# Patient Record
Sex: FEMALE | Race: WHITE | NOT HISPANIC OR LATINO | Employment: UNEMPLOYED | URBAN - METROPOLITAN AREA
[De-identification: names, ages, dates, MRNs, and addresses within clinical notes are randomized per-mention and may not be internally consistent; named-entity substitution may affect disease eponyms.]

---

## 2023-10-30 PROBLEM — R15.1 FECAL SOILING: Status: ACTIVE | Noted: 2023-10-30

## 2023-10-30 PROBLEM — K64.8 PROLAPSED INTERNAL HEMORRHOIDS: Status: ACTIVE | Noted: 2023-10-30

## 2023-10-30 PROBLEM — F41.9 ANXIETY: Status: ACTIVE | Noted: 2023-10-30

## 2023-10-30 PROBLEM — N63.20 LEFT BREAST MASS: Status: ACTIVE | Noted: 2023-10-30

## 2023-10-30 PROBLEM — R92.8 ABNORMAL MAMMOGRAM OF LEFT BREAST: Status: ACTIVE | Noted: 2023-10-30

## 2023-10-30 PROBLEM — L29.0 PRURITUS ANI: Status: ACTIVE | Noted: 2023-10-30

## 2023-10-30 RX ORDER — BROMPHENIRAMINE MALEATE, PSEUDOEPHEDRINE HYDROCHLORIDE, AND DEXTROMETHORPHAN HYDROBROMIDE 2; 30; 10 MG/5ML; MG/5ML; MG/5ML
SYRUP ORAL
COMMUNITY
Start: 2022-02-18

## 2023-10-30 RX ORDER — FLUTICASONE PROPIONATE 50 MCG
2 SPRAY, SUSPENSION (ML) NASAL DAILY
COMMUNITY
Start: 2022-02-18

## 2023-10-30 RX ORDER — DOXYCYCLINE HYCLATE 100 MG
100 TABLET ORAL EVERY 12 HOURS
COMMUNITY
Start: 2022-02-18 | End: 2023-11-17 | Stop reason: ALTCHOICE

## 2023-11-01 ENCOUNTER — APPOINTMENT (OUTPATIENT)
Dept: ORTHOPEDIC SURGERY | Facility: CLINIC | Age: 53
End: 2023-11-01

## 2023-11-09 ENCOUNTER — ANCILLARY PROCEDURE (OUTPATIENT)
Dept: RADIOLOGY | Facility: CLINIC | Age: 53
End: 2023-11-09
Payer: COMMERCIAL

## 2023-11-09 ENCOUNTER — OFFICE VISIT (OUTPATIENT)
Dept: ORTHOPEDIC SURGERY | Facility: CLINIC | Age: 53
End: 2023-11-09
Payer: COMMERCIAL

## 2023-11-09 DIAGNOSIS — G89.29 CHRONIC LOW BACK PAIN, UNSPECIFIED BACK PAIN LATERALITY, UNSPECIFIED WHETHER SCIATICA PRESENT: Primary | ICD-10-CM

## 2023-11-09 DIAGNOSIS — M54.16 LUMBAR RADICULOPATHY: ICD-10-CM

## 2023-11-09 DIAGNOSIS — G89.29 CHRONIC LOW BACK PAIN, UNSPECIFIED BACK PAIN LATERALITY, UNSPECIFIED WHETHER SCIATICA PRESENT: ICD-10-CM

## 2023-11-09 DIAGNOSIS — M54.50 CHRONIC LOW BACK PAIN, UNSPECIFIED BACK PAIN LATERALITY, UNSPECIFIED WHETHER SCIATICA PRESENT: Primary | ICD-10-CM

## 2023-11-09 DIAGNOSIS — M54.50 CHRONIC LOW BACK PAIN, UNSPECIFIED BACK PAIN LATERALITY, UNSPECIFIED WHETHER SCIATICA PRESENT: ICD-10-CM

## 2023-11-09 PROCEDURE — 72110 X-RAY EXAM L-2 SPINE 4/>VWS: CPT

## 2023-11-09 PROCEDURE — 99204 OFFICE O/P NEW MOD 45 MIN: CPT | Performed by: ORTHOPAEDIC SURGERY

## 2023-11-09 PROCEDURE — 99214 OFFICE O/P EST MOD 30 MIN: CPT | Performed by: ORTHOPAEDIC SURGERY

## 2023-11-09 PROCEDURE — 72110 X-RAY EXAM L-2 SPINE 4/>VWS: CPT | Performed by: RADIOLOGY

## 2023-11-09 NOTE — PROGRESS NOTES
Li Allison is a 53 y.o. female who presents for Pain of the Lower Back (LBP, Rt Leg Pain - X-Rays Today - Pain began 1 yr ago, Mobic and Steroids for Tx ).    HPI:  53-year-old female is here for evaluation of low back pain and right hip and right leg pain.  She denies any fever chills nausea vomiting night sweats she has no bowel or bladder complaints.    Physical exam:  Well-nourished, well kept. No lymphangitis or lymphadenopathy in the examined extremities.  Gait normal.  Can walk on heels and toes.   Examination of the back shows tenderness in the paraspinous musculature.  There is decreased range of motion in all directions due to guarding/muscle spasms and pain at extremes.  There is good strength and no instability.  Examination of the lower extremities reveals no point tenderness, swelling, or deformity.  Range of motion of the hips, knees, and ankles are full without crepitance, instability, or exacerbation of pain.  Strength is 5/5 throughout except she is exquisitely tender over the right greater trochanteric bursa.  Also except internal/external ranging of the right hip reproduces a lot of her hip and groin pain on the right side..  Gross sensation intact in the extremities.  Deep tendon reflexes 2+ and symmetric bilaterally.  clonus, negative.  Affect normal.  Alert and oriented ×3.  Coordination normal.    Imaging studies:  AP lateral flexion-extension plain films of the lumbar spine were ordered and reviewed today.    Assessment:  53-year-old female with a little more than a year history of midline low back pain and right leg radicular symptoms.  Overall the leg bothers her worse 80% versus 20% back.  Nothing on the left.  It starts in her low back radiates out into the hip a little bit into the groin down the lateral thigh and quadricep wraps around the knee goes into the calf and shin down into the dorsal aspect of the right foot.  She saw a couple of back doctors previously, the notes from   [facility] from January 10, 2023 were reviewed.  Has not done much for this other than some steroids and Mobic earlier this year.  No physical therapy no epidural injections no chiropractic no surgery on her back.  Think this may be 2 separate problems, low back with a spondylolisthesis and right hip.    Plan:    For complete plan and/or surgical details, please refer to Dr. Boo's portion of this split dictation.    In a face-to-face encounter, I performed a history and physical examination, discussed pertinent diagnostic studies if indicated, and discussed diagnosis and management strategies with both the patient and the midlevel provider.  I reviewed the midlevel's note and agree with the documented findings and plan of care.    Back pain right leg radiculopathy.  Patient has moderate right hip degenerative changes.  But she also has a grade 2 spondylolisthesis at L4-5.  This is likely more of a back problem than a hip problem.  She is overweight.  We had a long discussion today about her issues.  I think the best way to treat her is to get an MRI of her lumbar spine which she has never had done despite the fact that she seen multiple people for this including 2 different spine surgeons.  Were also can get her into physical therapy which she has never had done as well.  We are going to give her the name of a book today and a pamphlet to work on an anti-inflammatory diet which she said she is trying to do.  And we will see her back after the MRI.  It looks to me that if this MRI shows something on her back we would address the upstream/back problems first before we address her right hip degenerative changes.

## 2023-11-17 ENCOUNTER — OFFICE VISIT (OUTPATIENT)
Dept: URGENT CARE | Facility: CLINIC | Age: 53
End: 2023-11-17
Payer: COMMERCIAL

## 2023-11-17 VITALS
HEART RATE: 87 BPM | WEIGHT: 260 LBS | SYSTOLIC BLOOD PRESSURE: 159 MMHG | RESPIRATION RATE: 18 BRPM | OXYGEN SATURATION: 97 % | DIASTOLIC BLOOD PRESSURE: 84 MMHG | TEMPERATURE: 97.7 F

## 2023-11-17 DIAGNOSIS — J06.9 ACUTE URI: Primary | ICD-10-CM

## 2023-11-17 DIAGNOSIS — J01.91 ACUTE RECURRENT SINUSITIS, UNSPECIFIED LOCATION: ICD-10-CM

## 2023-11-17 PROCEDURE — 1036F TOBACCO NON-USER: CPT | Performed by: FAMILY MEDICINE

## 2023-11-17 PROCEDURE — 99203 OFFICE O/P NEW LOW 30 MIN: CPT | Performed by: FAMILY MEDICINE

## 2023-11-17 RX ORDER — DOXYCYCLINE 100 MG/1
100 CAPSULE ORAL 2 TIMES DAILY
Qty: 14 CAPSULE | Refills: 0 | Status: SHIPPED | OUTPATIENT
Start: 2023-11-17 | End: 2023-11-24

## 2023-11-17 ASSESSMENT — ENCOUNTER SYMPTOMS
FREQUENCY: 0
SINUS COMPLAINT: 1
CONSTIPATION: 0
NAUSEA: 0
WHEEZING: 1
CHEST TIGHTNESS: 0
SORE THROAT: 1
HEADACHES: 1
CHILLS: 1
ABDOMINAL PAIN: 0
PALPITATIONS: 0
SHORTNESS OF BREATH: 0
TROUBLE SWALLOWING: 1
RHINORRHEA: 0
COUGH: 1
DYSURIA: 0
SINUS PRESSURE: 1
DIARRHEA: 0
FEVER: 0
VOMITING: 0

## 2023-11-17 NOTE — PROGRESS NOTES
"Subjective   Patient ID: Li Allison is a 53 y.o. female.      53-year-old morbidly obese  female presents  stating \"I have a sinus infection\".  She reports onset of respiratory symptoms yesterday and reports that her 16-year-old son is recovering from of upper respiratory illness.  Reports she is taking Advil cold and sinus and Advil for symptom relief.      History provided by:  Patient  Sinus Problem  Severity:  Severe  Onset quality:  Unable to specify  Duration:  1 day  Timing:  Constant  Progression:  Worsening  Chronicity:  New  Associated symptoms: congestion, cough, ear pain, headaches, sore throat and wheezing    Associated symptoms: no abdominal pain, no diarrhea, no fever, no nausea, no rhinorrhea, no shortness of breath and no vomiting        The following portions of the chart were reviewed this encounter and updated as appropriate:  Tobacco  Allergies  Meds  Problems  Med Hx  Surg Hx  Fam Hx         Review of Systems   Constitutional:  Positive for chills. Negative for fever.   HENT:  Positive for congestion, ear pain, sinus pressure, sore throat and trouble swallowing. Negative for rhinorrhea.    Respiratory:  Positive for cough and wheezing. Negative for chest tightness and shortness of breath.    Cardiovascular:  Negative for palpitations.   Gastrointestinal:  Negative for abdominal pain, constipation, diarrhea, nausea and vomiting.   Genitourinary:  Negative for dysuria and frequency.   Neurological:  Positive for headaches.     Objective   Physical Exam  Vital signs are reviewed. Blood pressure elevated at 159/84. Alert and oriented x3 with normal mood and affect  Patient is well nourished, well-developed, alert and in no acute distress    Tender to palpation over frontal and maxillary sinus areas    External eyes, orbits, conjunctiva and eyelids are normal in appearance  Pupils are equal, round, reactive to light and accommodation, extraocular movements intact    External ears " appear normal  External canals are normal in appearance  Right tympanic membrane is intact and  pale pink in appearance  Left tympanic membrane is intact and  pale pink in appearance  There is no middle ear effusion noted on the right  There is no middle ear effusion noted on the left  External appearance of the nose is normal  Nasal mucosa, septum, turbinates are  moderately reddened and moderately swollen in appearance  There is  thin yellow nasal discharge in both nares    Oral mucosa is uniformly pink and moist  Palate is pink, symmetric and intact  Tongue is moist, mobile and midline  Tonsils are present, not enlarged,  moderately erythematous with no concretions or exudates present  No cervical lymphadenopathy palpated    Heart has regular rate and rhythm. No murmurs, rubs or gallops are auscultated at this exam.    Respiratory rate rhythm and effort are normal. Breath sounds bilaterally are clear on auscultation without crackles, rhonchi, wheezes or friction rub.    Abdomen: Normal bowel sounds on auscultation. Soft, nontender without rebound or rigidity on palpation          Procedures    Assessment/Plan   Diagnoses and all orders for this visit:  Acute URI  Acute recurrent sinusitis, unspecified location  -     doxycycline (Vibramycin) 100 mg capsule; Take 1 capsule (100 mg) by mouth 2 times a day for 7 days. Take with at least 8 ounces (large glass) of water, do not lie down for 30 minutes after

## 2023-11-17 NOTE — PATIENT INSTRUCTIONS
You have acute sinusitis. This can be a bacterial or viral infection. Based on your history and the clinical exam I am treating this as a bacterial infection.  Please increase your oral fluids for the next 7-10 days  Please take doxycycline as prescribed. Do not take this medication at the same meal that you are consuming dairy products or nutritional supplements as it will reduce absorption of the antibiotic. However try to take this medicine with at least an 8 ounce glass of water and some food to reduce the chances of stomach upset. I recommend using probiotics while taking the antibiotic and for a week to 10 days after you have completed this medication. Please ask the pharmacist for advice on appropriate product for this purpose.  May use Mucinex as per label instructions for nasal congestion  You may use nasal saline drops for relief of congestion as needed  You may mix 1 teaspoon of table salt with 8 ounces of warm water to rinse and gargle your sore throat.  You may do this repeatedly for up to 15 minutes if it seems to relieve your discomfort.  Do not swallow this liquid  May take Tylenol (acetaminophen) 325 mg, 2 tablets by mouth every 4-6 hours as needed for fever or discomfort. May take Motrin or Advil (ibuprofen) 200 mg, 2 tablets by mouth every 8 hours as needed for fever   If no improvement in 5-7 days please follow-up with your primary care provider  If fever greater than 102 degrees Fahrenheit, chills, nausea, vomiting, increased redness, tenderness, pain over for head or cheek bone areas, bloody nasal discharge, increased difficulty breathing, difficulty swallowing, increased wheezing, shortness of breath please go immediately to emergency room for further evaluation  This note was generated by voice recognition software. Minor transcription/grammatical errors may be present. Please call for clarification.

## 2023-11-18 RX ORDER — AMOXICILLIN 875 MG/1
875 TABLET, FILM COATED ORAL 2 TIMES DAILY
Qty: 20 TABLET | Refills: 0 | Status: SHIPPED | OUTPATIENT
Start: 2023-11-18 | End: 2023-11-28

## 2023-11-28 DIAGNOSIS — G89.29 CHRONIC LOW BACK PAIN, UNSPECIFIED BACK PAIN LATERALITY, UNSPECIFIED WHETHER SCIATICA PRESENT: ICD-10-CM

## 2023-11-28 DIAGNOSIS — M54.50 CHRONIC LOW BACK PAIN, UNSPECIFIED BACK PAIN LATERALITY, UNSPECIFIED WHETHER SCIATICA PRESENT: ICD-10-CM

## 2023-11-28 DIAGNOSIS — M54.16 LUMBAR RADICULOPATHY: ICD-10-CM

## 2023-11-28 RX ORDER — CYCLOBENZAPRINE HCL 10 MG
10 TABLET ORAL 3 TIMES DAILY PRN
Qty: 30 TABLET | Refills: 0 | Status: SHIPPED | OUTPATIENT
Start: 2023-11-28 | End: 2024-04-09

## 2023-11-30 ENCOUNTER — TELEPHONE (OUTPATIENT)
Dept: ORTHOPEDIC SURGERY | Facility: CLINIC | Age: 53
End: 2023-11-30
Payer: COMMERCIAL

## 2023-12-01 DIAGNOSIS — M54.16 LUMBAR RADICULOPATHY: ICD-10-CM

## 2023-12-05 NOTE — PROGRESS NOTES
History of Present Complaint:  The patient was referred to us by Referring Provider: Sha Boo (the patient called [facility] office stating that Flexeril is not helping with his discomfort so was sent to pain management!). this is 53 y.o.  female Not accompanied by another personwith a past history of morbid obesity BMI 54.4, depression and bipolar with untreated hypertension she lost her PCP because she refused blood test for her fear of needles (Trypanophobia) presenting with right leg pain mostly in the groin area that shoots down to her knee and sometimes numbness tingling down the anterior shin to her big toe.  Patient has no paralysis no saddle paresthesia no incontinence.  The patient stated that she had this pain on and off for many years.  When I asked her about her high blood pressure today she stated she does not have a primary care because the primary care kicked her out since she would not allow him to drop blood for her work because of her phobia.  The patient does not have any MRI.  She said that she had x-ray of her right hip somewhere in the system but it is not available to review.  X-ray of the lumbar spine showed severe arthritis of the right hip which correlate with her exam.  The patient does not want any injections.  I told her that I will start her on a prednisone taper dose and I will get her connected with our comprehensive program.  In the meantime I encouraged her to find a primary care physician to treat her blood pressure since meloxicam may increase her blood pressure.  The patient could have her injection of her right hip done after lorazepam which patient calls if she decided to proceed with that.      Procedures:   None the patient has major phobia of needles she does not allow her PCP to draw her blood    Portions of record reviewed for pertinent issues: active problem list, medication list, allergies, family history, social history, notes from last encounter, encounters,  lab results, imaging and other available records.    I have personally reviewed the [facility] report for this patient. This report is scanned into the electronic medical record. I have considered the risks of abuse, dependence, addiction and diversion. It showed: No chronic controlled substance  OPIOID RISK ASSESSMENT SCORE 3/26  Aberrant behavior: none      Diagnostic studies:  11/9/2023 flexion-extension x-ray of the lumbar spine showed movement of grade 1-2 spondylolisthesis at L4-5 worsening on flexion and severe loss right hip space      Employment/disability/litigation: The patient works for [employer] for 18 years then injured her ankle most likely [employer] and ended up on disability    Social History:  with 2 children finished high school denies smoking drinking or use of illicit drugs      Review of Systems   HENT: Negative.     Eyes: Negative.    Respiratory: Negative.     Cardiovascular: Negative.    Gastrointestinal: Negative.    Endocrine: Negative.    Genitourinary: Negative.    Musculoskeletal:  Positive for arthralgias, back pain and gait problem.   Skin: Negative.    Hematological: Negative.    Psychiatric/Behavioral: Negative.        Physical Exam  Nursing note reviewed.   Constitutional:       Appearance: Normal appearance.   HENT:      Head: Normocephalic and atraumatic.      Nose: Nose normal.   Eyes:      Extraocular Movements: Extraocular movements intact.      Conjunctiva/sclera: Conjunctivae normal.      Pupils: Pupils are equal, round, and reactive to light.   Cardiovascular:      Rate and Rhythm: Normal rate and regular rhythm.      Pulses: Normal pulses.      Heart sounds: Normal heart sounds.   Pulmonary:      Effort: Pulmonary effort is normal.      Breath sounds: Normal breath sounds.   Abdominal:      General: Abdomen is flat. Bowel sounds are normal.      Palpations: Abdomen is soft.   Musculoskeletal:         General: Tenderness present.      Comments: Significant pain with  lateral rotation of her right hip   Skin:     General: Skin is warm.   Neurological:      General: No focal deficit present.      Mental Status: She is alert and oriented to person, place, and time.      Cranial Nerves: Cranial nerves 2-12 are intact.      Sensory: Sensation is intact.      Motor: Motor function is intact.      Coordination: Coordination is intact.      Gait: Gait is intact.      Deep Tendon Reflexes: Reflexes are normal and symmetric.      Comments: The patient walks favoring her right leg   Psychiatric:         Mood and Affect: Mood normal.         Behavior: Behavior normal.            Assessment  Morbidly obese pleasant lady that she has major phobia with needles even her primary care has to dismiss her because she is having hard time drawing drawing her blood for blood test.  The patient is here because of her pain in the groin that goes anterior thigh which really clearly correlate with her right hip arthritis and also she has radiculopathy down in the L4-5 level.  The patient has a stated refused any injections.  She is going to start physical therapy soon.  She has no incontinence no saddle paresthesia no paralysis at this time.  I am going to start her on a prednisone taper dose in addition to meloxicam 15 mg after she is done with her prednisone for her arthritis pain.  The patient stated that she had x-[facility] of her hip at the [facility] somewhere but I could not find it but her x-ray of the lumbar spine showed significant disease in the right hip with decreased joint space.         Plan  At least 50% of the visit was involved in the discussion of the options for treatment. We discussed exercises, medication, interventional therapies and surgery. Healthy life style is essential with patient hard work to achieve the wellness. In addition; discussion with the patient and/or family about any of the diagnostic results, impressions and/or recommended diagnostic studies, prognosis, risks and  benefits of treatment options, instructions for treatment and/or follow-up, importance of compliance with chosen treatment options, risk-factor reduction, and patient/family education.         Pool therapy, walking in the pool, at least 3x per week for 30 minutes  Referral to [facility]  Patient is going to physical therapy shortly  Prednisone 10 mg taper dose  Meloxicam 15 mg to take after her please take as directed after lunch:  6 TABS X 2 DAYS, 5 TABS X 2 DAYS, 4 TABS X 2 DAYS, 3 TABS X  2 DAYS  2 TABS X 7 DAYS, 1 TAB  X  2 WEEKS  For arthritis pain  Right hip joint injection when patient calls after lorazepam 2 mg  Consider right L4-5 TFESI when the patient calls  Healthy lifestyle and anti-inflammatory diet in addition to weight control discussed with the patient  Alternative chronic pain therapies was discussed, encouraged and information was handed  Return to Clinic 3 months       *Please note this report has been produced using speech recognition software and may contain errors related to that system including grammar, punctuation and spelling as well as words and phrases that may be inappropriate. If there are questions or concerns, please feel free to contact me to clarify.    Nubia Cabral MD

## 2023-12-06 ENCOUNTER — OFFICE VISIT (OUTPATIENT)
Dept: PAIN MEDICINE | Facility: CLINIC | Age: 53
End: 2023-12-06
Payer: COMMERCIAL

## 2023-12-06 VITALS
DIASTOLIC BLOOD PRESSURE: 80 MMHG | TEMPERATURE: 97.2 F | SYSTOLIC BLOOD PRESSURE: 152 MMHG | HEIGHT: 61 IN | RESPIRATION RATE: 16 BRPM | WEIGHT: 288 LBS | HEART RATE: 83 BPM | BODY MASS INDEX: 54.37 KG/M2

## 2023-12-06 DIAGNOSIS — M47.27 LUMBOSACRAL SPONDYLOSIS WITH RADICULOPATHY: Primary | ICD-10-CM

## 2023-12-06 DIAGNOSIS — F40.298 FEAR OF NEEDLES: ICD-10-CM

## 2023-12-06 DIAGNOSIS — M43.17 ACQUIRED SPONDYLOLISTHESIS OF LUMBOSACRAL REGION: ICD-10-CM

## 2023-12-06 DIAGNOSIS — M54.16 LUMBAR RADICULOPATHY: ICD-10-CM

## 2023-12-06 PROCEDURE — 99214 OFFICE O/P EST MOD 30 MIN: CPT | Performed by: ANESTHESIOLOGY

## 2023-12-06 PROCEDURE — 1036F TOBACCO NON-USER: CPT | Performed by: ANESTHESIOLOGY

## 2023-12-06 PROCEDURE — 99204 OFFICE O/P NEW MOD 45 MIN: CPT | Performed by: ANESTHESIOLOGY

## 2023-12-06 RX ORDER — MELOXICAM 15 MG/1
15 TABLET ORAL DAILY
Qty: 30 TABLET | Refills: 2 | Status: SHIPPED | OUTPATIENT
Start: 2023-12-06 | End: 2024-02-16 | Stop reason: SDUPTHER

## 2023-12-06 RX ORDER — PREDNISONE 10 MG/1
TABLET ORAL
Qty: 64 TABLET | Refills: 0 | Status: SHIPPED | OUTPATIENT
Start: 2023-12-06 | End: 2024-02-16 | Stop reason: SDUPTHER

## 2023-12-06 ASSESSMENT — COLUMBIA-SUICIDE SEVERITY RATING SCALE - C-SSRS
2. HAVE YOU ACTUALLY HAD ANY THOUGHTS OF KILLING YOURSELF?: NO
6. HAVE YOU EVER DONE ANYTHING, STARTED TO DO ANYTHING, OR PREPARED TO DO ANYTHING TO END YOUR LIFE?: NO
1. IN THE PAST MONTH, HAVE YOU WISHED YOU WERE DEAD OR WISHED YOU COULD GO TO SLEEP AND NOT WAKE UP?: NO

## 2023-12-06 ASSESSMENT — ENCOUNTER SYMPTOMS
DEPRESSION: 0
HEMATOLOGIC/LYMPHATIC NEGATIVE: 1
OCCASIONAL FEELINGS OF UNSTEADINESS: 0
RESPIRATORY NEGATIVE: 1
BACK PAIN: 1
LOSS OF SENSATION IN FEET: 0
PSYCHIATRIC NEGATIVE: 1
CARDIOVASCULAR NEGATIVE: 1
EYES NEGATIVE: 1
GASTROINTESTINAL NEGATIVE: 1
ENDOCRINE NEGATIVE: 1
ARTHRALGIAS: 1

## 2023-12-06 ASSESSMENT — PATIENT HEALTH QUESTIONNAIRE - PHQ9
SUM OF ALL RESPONSES TO PHQ9 QUESTIONS 1 AND 2: 0
2. FEELING DOWN, DEPRESSED OR HOPELESS: NOT AT ALL
1. LITTLE INTEREST OR PLEASURE IN DOING THINGS: NOT AT ALL

## 2023-12-06 ASSESSMENT — LIFESTYLE VARIABLES: TOTAL SCORE: 3

## 2023-12-06 ASSESSMENT — PAIN SCALES - GENERAL: PAINLEVEL: 8

## 2023-12-06 NOTE — PROGRESS NOTES
Chief Complaint   Patient presents with    Back Pain    New Patient Visit     Right leg pain     History of Present Complaint:  The patient was referred to us by Referring Provider: Sha Boo . this is 53 y.o.  female  Not accompanied by another person with a past history of  chronic back pain  presenting with Lower back pain and right-sided leg pain.  Patient is very afraid of needles and states that if she has to have any kind of treatment with needles she would not be interested.    Pain started One years ago .pain is better throughout the summer winter months the pain comes back pain is The same worse   The pain is described as   describes it as having sharp pains  and is relieved by none      Prior Pain Therapies:  pain mobic and steroids   Past surgical history:   Left ear surgery, tubal ligation    Employment/disability/litigation:  Patient worked for [employer] for 18 years fractured right ankle and was unable to go back to work    Social history: , Has 2children, 0grandchildren and 0great-grandchildren, Finished high school, and Denies smoking drinking or use of illicit drugs    Diagnostic studies: xrays    Opioid Risk Assessment Score 3/26 bipolar depression

## 2023-12-07 DIAGNOSIS — M54.16 LUMBAR RADICULOPATHY: ICD-10-CM

## 2023-12-07 DIAGNOSIS — M43.17 ACQUIRED SPONDYLOLISTHESIS OF LUMBOSACRAL REGION: ICD-10-CM

## 2023-12-07 DIAGNOSIS — M47.27 LUMBOSACRAL SPONDYLOSIS WITH RADICULOPATHY: ICD-10-CM

## 2023-12-08 ENCOUNTER — EVALUATION (OUTPATIENT)
Dept: PHYSICAL THERAPY | Facility: CLINIC | Age: 53
End: 2023-12-08
Payer: COMMERCIAL

## 2023-12-08 DIAGNOSIS — M54.16 LUMBAR RADICULOPATHY: ICD-10-CM

## 2023-12-08 PROCEDURE — 97161 PT EVAL LOW COMPLEX 20 MIN: CPT | Mod: GP | Performed by: PHYSICAL THERAPIST

## 2023-12-08 RX ORDER — PREDNISONE 10 MG/1
TABLET ORAL
Qty: 64 TABLET | Refills: 0 | Status: SHIPPED | OUTPATIENT
Start: 2023-12-08 | End: 2024-05-01 | Stop reason: SDUPTHER

## 2023-12-08 RX ORDER — PREDNISONE 10 MG/1
TABLET ORAL
Qty: 64 TABLET | Refills: 0 | OUTPATIENT
Start: 2023-12-08

## 2023-12-08 ASSESSMENT — PATIENT HEALTH QUESTIONNAIRE - PHQ9
1. LITTLE INTEREST OR PLEASURE IN DOING THINGS: NOT AT ALL
2. FEELING DOWN, DEPRESSED OR HOPELESS: NOT AT ALL
SUM OF ALL RESPONSES TO PHQ9 QUESTIONS 1 AND 2: 0

## 2023-12-08 ASSESSMENT — ENCOUNTER SYMPTOMS
DEPRESSION: 0
LOSS OF SENSATION IN FEET: 0
OCCASIONAL FEELINGS OF UNSTEADINESS: 0

## 2023-12-08 NOTE — PROGRESS NOTES
Patient Name Li Allison  MRN: [id number]  Today's Date: 23  Time Calculation  Start Time: 830  Stop Time: 915  Time Calculation (min): 45 min    Insurance:   Visit #: 1  [employer]/med necessity/100%/ded met  Insurance Reviewed  (per information provided by  pre-cert team)   Authorization required:  Y  Approved # of visits:  Authorization date range:      Therapy Diagnoses:   1. Lumbar radiculopathy  Referral to Physical Therapy    Follow Up In Physical Therapy        General:  Reason for visit: LS radiculopathy   Referred by: Nubia Wylie MD appt:  none currently scheduled  Preferred Name:  Li  Script:  PT  Onset Date:  22  Most recent assessment/re-assessment: 23  Email/phone #:      Assessment:    Evolving with changing characteristics  Level of complexity:  low  Patient with flare up of OA in the LS/possible R piriformis syndrome and R hip OA, needs work on/Skilled PT for ROM, flexibility, dynamic stabilization, strength, posture, body mechanics and gait/stairs for improved overall function.       Problems:    Pain:  __x_  Posture/Body mechanics deficits:  _x__  Decreased knowledge HEP:  _x__  Decreased knowledge of Precautions:  ___  Activity Limitations:   __x_  ADL's/IADL's/Self-care skills:  __x_  ROM/Joint Mobility:  _x__  Strength:  _x__  Decreased functional level:   _x__  Flexibility:  _x__  Tenderness/decreased soft tissue mobility:  __x_  Gait/Stairs:  _x__  Fall Risk:  ___  Balance:  __x_  Edema:  ___  Participation restrictions:  ___  Sensory:  ___  Transfers:  ___  Decreased knowledge of brace:   ___  Other:  ___    X Indicates included in problem list    Goals:    STG:    -Increased postural awareness  -Compliant with HEP.   LTG:  by discharge  -Increased postural awareness and posture WFL.  - pain to:  2/10 and patient I with self management of symptoms.   -Decreased pain and increased function with ADL's and IADL's.  Improve Oswestry  to: 15%  limitation  of function.  -Normal gait on level and uneven surfaces community level distances for improved function in the community.  -Normal reciprocal pattern on stairs for improved function to all levels of home.    -Increase trunk AROM to WFL for improved function with dressing and driving.    -Increase trunk strength and stability and R/L LE strength to WFL for improved function with chores.  -Increase B LE flexibility to WFL.    -Decrease R /lower quarter tenderness 50-75% per patient report.  -Decrease R/L LE radicular symptoms 50-75% per patient report.    -I and compliant with HEP and proper: lower back care.     Rehab potential to achieve the above goals is good.    Patient is aware of diagnosis and prognosis and agrees with established plan of care and goals.    Plan:   Skilled PT 2 x/week for 12 weeks( Until goals achieved, maximum rehab potential has been achieved, or patient has plateaued)  for:    Aquatics  ___x__  CP   __x__  Dry needling  ____  Education  __x__  Electrical Stimulation  __x__  Gait training  __x__  HEP  __x__  Manual  __x__  Mechanical Traction  ____  NMR  _x___  Self-care/home management  __x__  Therapeutic Exercise   __x__  Therapeutic Activities  __x__  US  ____  Work Conditioning  ____  Re-assessment  __x__  Other  ____    X Indicates included in treatment plan    PT for Aquatics for work on ROM, flexibility, dynamic stabilization, strength, posture, body mechanics and gait/stairs for improved overall function/one to two days land for instruction in proper land program.       Subjective:  HPI: Patient reports chronic LS and R hip and radicular pain that gets worse in the cold weather.  Patient saw Dr. Cabral and he reports that she would benefit from a R hip injection/due to severe OA.  Patient has prescription for Prednisone and Mobic, has only been taking the Prednisone for a few days and will start the Mobic when done with that.  Patient is referred for aquatic PT.      Pain  Type of  pain:  R LS/buttock and ant/post LE to toes at time achin/10, worst:  9/10  What increases pain: prolonged sitting  What decreases pain:  walking relieved, but R hip pain increases with prolonged walking.      Medical Hx/    Precautions: HTN, OA, HA, s/p R ankle ORIF 2009, see meds in chart    Adult Screening Risk:    Fall Risk:  no  Initial Fall Risk Screening:   Patient has not fallen in the last 6 months. Patient does not have a fear of falling. They do not need assistance with sitting, standing or walking. Does not need assistance walking in her home. They do not need assistance in an unfamiliar setting. The patient is not using an assistive device.     Depression/Suicide Screening:   During the past 2 weeks, the patient has not felt down, depressed or hopeless.    During the past 2 weeks, the patient has not felt little interest or pleasure in doing things.    They do not have a risk of suicide.       Human Trafficking risk:  No    Patient goal:  Decreased pain and increased function.   Patient is aware of diagnosis and prognosis.    Living Environment:  ranch with basement laundry with rail  Social Support:  lives with:  , sons, 3 dogs, cat, 7 birds      Prior Function:   improved function in warmer weather    Function:    A and O x 3  Sleep: poor, supine, adjustable feet, instructed in proper postures.  ADL's:  pain with putting shoes and socks on the R LE.    Chores:  motorized cart for grocery shopping, light cleaning only and needs to pace.    Driving:  WFL  Work: stay at home Mom  Recreation: wants to be able to bowl and be active with kids, likes to go to [place].    Sittin' Standin'   Walkin'    Objective:    Outcome Measures:  Other Measures  Oswestry Disablity Index (ARELY): 46 (23/50:  46% limitation of function)     Posture:  min for flexed posture, increased lumbar lordosis and pelvic landmarks symmetrical.      Gait/Stairs: moderately decreased trunk rotation and hip  "extension. Step to pattern on stairs with rail with L LE WB.    Palpation:  moderate R piriformis.      ROM:  Trunk Flexion:  3\"3rd to floor  Extension: 0/25  RSB:  1\" 3rd to fibular head pain R LS and LE  LSB:  WNL  RR: 25%  LR: 25%  R hip IR:  0    MMT:  Abd:  1/5  Bridge: 25%  B LE myotomes:  WNL and symmetrical, B knees buckle at times.      Flexibility:  Hamstrings:  90/90:  R: -22  L:  -18  Heelcords: 0 B  Hip flexors: mod B  Gluts: R:  sev  L:  mod  Piriformis: R:  sev  L:  mod    Neuro:  (at re-assessment)    Treatment:    Evaluation:  45 minutes    **= HEP  NV= Next visit  np= not performed  nb= non-billable  G= group HEP= discharged to HEP    Therapeutic Exercise:    Nu-step(subjective taken/education):    NV    Standing hams and hip flexor/calf stretches: NV    Seated flexion stretch:  NV    LTR: NV  B SKTC:  NV  B supine piriformis stretch:  NV    NMR:     T-band 3-way pull downs:  NV  T-band obliques: NV  T-band B pull for/back:  NV    Pelvic tilt:  NV  Bridges:  NV  PT hooklying with add set:  NV  PT hooklying with t-band abd:  NV     Education:  poc, anatomy, physiology, posture, body mechanics, safety awareness, HEP.  Preferred learning:  pictures, demonstration.  Demonstrated good understanding.                                       "

## 2023-12-20 ENCOUNTER — DOCUMENTATION (OUTPATIENT)
Dept: PHYSICAL THERAPY | Facility: CLINIC | Age: 53
End: 2023-12-20
Payer: COMMERCIAL

## 2023-12-20 ENCOUNTER — APPOINTMENT (OUTPATIENT)
Dept: PHYSICAL THERAPY | Facility: CLINIC | Age: 53
End: 2023-12-20
Payer: COMMERCIAL

## 2023-12-20 NOTE — PROGRESS NOTES
Physical Therapy                 Therapy Communication Note    Patient Name: Li Allison  MRN: [id number]  Today's Date: 12/20/2023     Discipline: Physical Therapy    Missed Visit Reason:  fell on porch/may have passed out    Missed Time: Cancel    Comment:

## 2023-12-21 ENCOUNTER — APPOINTMENT (OUTPATIENT)
Dept: PHYSICAL THERAPY | Facility: CLINIC | Age: 53
End: 2023-12-21
Payer: COMMERCIAL

## 2023-12-27 ENCOUNTER — APPOINTMENT (OUTPATIENT)
Dept: PHYSICAL THERAPY | Facility: CLINIC | Age: 53
End: 2023-12-27
Payer: COMMERCIAL

## 2023-12-28 ENCOUNTER — APPOINTMENT (OUTPATIENT)
Dept: PRIMARY CARE | Facility: CLINIC | Age: 53
End: 2023-12-28
Payer: COMMERCIAL

## 2024-01-09 ENCOUNTER — APPOINTMENT (OUTPATIENT)
Dept: PHYSICAL THERAPY | Facility: CLINIC | Age: 54
End: 2024-01-09
Payer: COMMERCIAL

## 2024-01-17 ENCOUNTER — DOCUMENTATION (OUTPATIENT)
Dept: PHYSICAL THERAPY | Facility: CLINIC | Age: 54
End: 2024-01-17
Payer: COMMERCIAL

## 2024-01-17 NOTE — PROGRESS NOTES
Physical Therapy                 Therapy Communication Note    Patient Name: Li Allison  MRN: [id number]  Today's Date: 1/17/2024     Discipline: Physical Therapy    Missed Visit Reason:      Missed Time: No Show    Comment: Voicemail left regarding missed appointment and next scheduled appointment.

## 2024-01-23 ENCOUNTER — APPOINTMENT (OUTPATIENT)
Dept: PHYSICAL THERAPY | Facility: CLINIC | Age: 54
End: 2024-01-23
Payer: COMMERCIAL

## 2024-01-25 ENCOUNTER — APPOINTMENT (OUTPATIENT)
Dept: PHYSICAL THERAPY | Facility: CLINIC | Age: 54
End: 2024-01-25
Payer: COMMERCIAL

## 2024-01-25 ENCOUNTER — DOCUMENTATION (OUTPATIENT)
Dept: PHYSICAL THERAPY | Facility: CLINIC | Age: 54
End: 2024-01-25
Payer: COMMERCIAL

## 2024-01-25 NOTE — PROGRESS NOTES
Physical Therapy    Discharge Summary    Name: Li Allison  MRN: [id number]  : 1970  Date: 24    Discharge Summary: PT    Discharge Information: Date of discharge 24    Therapy Summary: Patient left a message stating that she will be cx all apts do to try to find another doctor     Discharge Status: unable to re-assess     Rehab Discharge Reason: Other per patient, see above.

## 2024-01-30 ENCOUNTER — APPOINTMENT (OUTPATIENT)
Dept: PHYSICAL THERAPY | Facility: CLINIC | Age: 54
End: 2024-01-30
Payer: COMMERCIAL

## 2024-02-01 ENCOUNTER — TELEPHONE (OUTPATIENT)
Dept: PAIN MEDICINE | Facility: CLINIC | Age: 54
End: 2024-02-01
Payer: COMMERCIAL

## 2024-02-01 ENCOUNTER — APPOINTMENT (OUTPATIENT)
Dept: PHYSICAL THERAPY | Facility: CLINIC | Age: 54
End: 2024-02-01
Payer: COMMERCIAL

## 2024-02-06 ENCOUNTER — APPOINTMENT (OUTPATIENT)
Dept: PHYSICAL THERAPY | Facility: CLINIC | Age: 54
End: 2024-02-06
Payer: COMMERCIAL

## 2024-02-08 ENCOUNTER — APPOINTMENT (OUTPATIENT)
Dept: PHYSICAL THERAPY | Facility: CLINIC | Age: 54
End: 2024-02-08
Payer: COMMERCIAL

## 2024-02-13 ENCOUNTER — APPOINTMENT (OUTPATIENT)
Dept: PHYSICAL THERAPY | Facility: CLINIC | Age: 54
End: 2024-02-13
Payer: COMMERCIAL

## 2024-02-16 DIAGNOSIS — M54.16 LUMBAR RADICULOPATHY: ICD-10-CM

## 2024-02-16 DIAGNOSIS — M47.27 LUMBOSACRAL SPONDYLOSIS WITH RADICULOPATHY: ICD-10-CM

## 2024-02-16 DIAGNOSIS — M43.17 ACQUIRED SPONDYLOLISTHESIS OF LUMBOSACRAL REGION: ICD-10-CM

## 2024-02-19 RX ORDER — MELOXICAM 15 MG/1
15 TABLET ORAL DAILY
Qty: 30 TABLET | Refills: 2 | Status: SHIPPED | OUTPATIENT
Start: 2024-02-19 | End: 2024-04-10

## 2024-02-19 RX ORDER — PREDNISONE 10 MG/1
TABLET ORAL
Qty: 64 TABLET | Refills: 0 | Status: SHIPPED | OUTPATIENT
Start: 2024-02-19 | End: 2024-02-26

## 2024-02-20 ENCOUNTER — APPOINTMENT (OUTPATIENT)
Dept: PHYSICAL THERAPY | Facility: CLINIC | Age: 54
End: 2024-02-20
Payer: COMMERCIAL

## 2024-02-22 ENCOUNTER — APPOINTMENT (OUTPATIENT)
Dept: PHYSICAL THERAPY | Facility: CLINIC | Age: 54
End: 2024-02-22
Payer: COMMERCIAL

## 2024-02-26 DIAGNOSIS — M47.27 LUMBOSACRAL SPONDYLOSIS WITH RADICULOPATHY: ICD-10-CM

## 2024-02-26 DIAGNOSIS — M54.16 LUMBAR RADICULOPATHY: ICD-10-CM

## 2024-02-26 DIAGNOSIS — M43.17 ACQUIRED SPONDYLOLISTHESIS OF LUMBOSACRAL REGION: ICD-10-CM

## 2024-02-26 RX ORDER — PREDNISONE 10 MG/1
TABLET ORAL
Qty: 64 TABLET | Refills: 0 | Status: SHIPPED | OUTPATIENT
Start: 2024-02-26 | End: 2024-04-01

## 2024-03-14 DIAGNOSIS — M54.16 LUMBAR RADICULOPATHY: ICD-10-CM

## 2024-03-14 DIAGNOSIS — M47.27 LUMBOSACRAL SPONDYLOSIS WITH RADICULOPATHY: ICD-10-CM

## 2024-03-14 DIAGNOSIS — M43.17 ACQUIRED SPONDYLOLISTHESIS OF LUMBOSACRAL REGION: ICD-10-CM

## 2024-03-14 RX ORDER — PREDNISONE 10 MG/1
TABLET ORAL
Qty: 64 TABLET | Refills: 0 | Status: SHIPPED | OUTPATIENT
Start: 2024-03-14

## 2024-03-31 DIAGNOSIS — M47.27 LUMBOSACRAL SPONDYLOSIS WITH RADICULOPATHY: ICD-10-CM

## 2024-03-31 DIAGNOSIS — M54.16 LUMBAR RADICULOPATHY: ICD-10-CM

## 2024-03-31 DIAGNOSIS — M43.17 ACQUIRED SPONDYLOLISTHESIS OF LUMBOSACRAL REGION: ICD-10-CM

## 2024-04-01 RX ORDER — PREDNISONE 10 MG/1
TABLET ORAL
Qty: 64 TABLET | Refills: 0 | Status: SHIPPED | OUTPATIENT
Start: 2024-04-01

## 2024-04-09 DIAGNOSIS — M43.17 ACQUIRED SPONDYLOLISTHESIS OF LUMBOSACRAL REGION: ICD-10-CM

## 2024-04-09 DIAGNOSIS — M54.50 CHRONIC LOW BACK PAIN, UNSPECIFIED BACK PAIN LATERALITY, UNSPECIFIED WHETHER SCIATICA PRESENT: ICD-10-CM

## 2024-04-09 DIAGNOSIS — M54.16 LUMBAR RADICULOPATHY: ICD-10-CM

## 2024-04-09 DIAGNOSIS — G89.29 CHRONIC LOW BACK PAIN, UNSPECIFIED BACK PAIN LATERALITY, UNSPECIFIED WHETHER SCIATICA PRESENT: ICD-10-CM

## 2024-04-09 DIAGNOSIS — M47.27 LUMBOSACRAL SPONDYLOSIS WITH RADICULOPATHY: ICD-10-CM

## 2024-04-09 RX ORDER — CYCLOBENZAPRINE HCL 10 MG
10 TABLET ORAL 3 TIMES DAILY PRN
Qty: 30 TABLET | Refills: 0 | Status: SHIPPED | OUTPATIENT
Start: 2024-04-09 | End: 2024-05-13

## 2024-04-10 RX ORDER — MELOXICAM 15 MG/1
TABLET ORAL
Qty: 30 TABLET | Refills: 2 | Status: SHIPPED | OUTPATIENT
Start: 2024-04-10 | End: 2024-05-01 | Stop reason: SDUPTHER

## 2024-04-24 DIAGNOSIS — M54.16 LUMBAR RADICULOPATHY: ICD-10-CM

## 2024-04-24 DIAGNOSIS — M47.27 LUMBOSACRAL SPONDYLOSIS WITH RADICULOPATHY: ICD-10-CM

## 2024-04-24 DIAGNOSIS — M43.17 ACQUIRED SPONDYLOLISTHESIS OF LUMBOSACRAL REGION: ICD-10-CM

## 2024-04-25 RX ORDER — PREDNISONE 10 MG/1
TABLET ORAL
Qty: 64 TABLET | Refills: 0 | OUTPATIENT
Start: 2024-04-25

## 2024-04-25 NOTE — TELEPHONE ENCOUNTER
The patient had referral on 4/1/2024 according to the epic.  If she wants to continue with the prednisone she need to discuss that with her primary care.  The patient is requiring a lot of refill on his steroid which could be harmful to her.  I know she is morbidly obese with BMI 54.4 with Trypanophobia she does not even allow her PCP to draw her lab work!!  It is very difficult case to treat

## 2024-04-28 DIAGNOSIS — M47.27 LUMBOSACRAL SPONDYLOSIS WITH RADICULOPATHY: ICD-10-CM

## 2024-04-28 DIAGNOSIS — M54.16 LUMBAR RADICULOPATHY: ICD-10-CM

## 2024-04-28 DIAGNOSIS — M43.17 ACQUIRED SPONDYLOLISTHESIS OF LUMBOSACRAL REGION: ICD-10-CM

## 2024-04-29 RX ORDER — PREDNISONE 10 MG/1
TABLET ORAL
Qty: 64 TABLET | Refills: 0 | OUTPATIENT
Start: 2024-04-29

## 2024-05-01 DIAGNOSIS — M43.17 ACQUIRED SPONDYLOLISTHESIS OF LUMBOSACRAL REGION: ICD-10-CM

## 2024-05-01 DIAGNOSIS — M47.27 LUMBOSACRAL SPONDYLOSIS WITH RADICULOPATHY: ICD-10-CM

## 2024-05-01 DIAGNOSIS — M54.16 LUMBAR RADICULOPATHY: ICD-10-CM

## 2024-05-02 RX ORDER — PREDNISONE 10 MG/1
TABLET ORAL
Qty: 64 TABLET | Refills: 0 | Status: SHIPPED | OUTPATIENT
Start: 2024-05-02 | End: 2024-06-11 | Stop reason: ALTCHOICE

## 2024-05-02 RX ORDER — MELOXICAM 15 MG/1
15 TABLET ORAL DAILY
Qty: 90 TABLET | Refills: 3 | Status: SHIPPED | OUTPATIENT
Start: 2024-05-02 | End: 2024-06-11 | Stop reason: ALTCHOICE

## 2024-05-07 NOTE — TELEPHONE ENCOUNTER
Know the maximum dose of Mobic is 15 mg/day.  She can have her refill on the prednisone since she has major phobia of the needles and she does not want to have any injections

## 2024-05-09 ENCOUNTER — TELEPHONE (OUTPATIENT)
Dept: PAIN MEDICINE | Facility: CLINIC | Age: 54
End: 2024-05-09
Payer: COMMERCIAL

## 2024-05-09 NOTE — TELEPHONE ENCOUNTER
Pt wants to know why you won't refill her prednisone. Also wanted to know if she should be referred to someone else for the pain.

## 2024-05-09 NOTE — PROGRESS NOTES
The patient already had prednisone recently.  And I do not really have much to offer her regarding treatment because of her needle phobia.  She could try any pain management center may be the [facility] to help no touch technique to help with her pains

## 2024-05-12 DIAGNOSIS — M54.50 CHRONIC LOW BACK PAIN, UNSPECIFIED BACK PAIN LATERALITY, UNSPECIFIED WHETHER SCIATICA PRESENT: ICD-10-CM

## 2024-05-12 DIAGNOSIS — G89.29 CHRONIC LOW BACK PAIN, UNSPECIFIED BACK PAIN LATERALITY, UNSPECIFIED WHETHER SCIATICA PRESENT: ICD-10-CM

## 2024-05-12 DIAGNOSIS — M54.16 LUMBAR RADICULOPATHY: ICD-10-CM

## 2024-05-13 RX ORDER — CYCLOBENZAPRINE HCL 10 MG
10 TABLET ORAL 3 TIMES DAILY PRN
Qty: 30 TABLET | Refills: 0 | Status: SHIPPED | OUTPATIENT
Start: 2024-05-13 | End: 2024-05-23

## 2024-05-14 ENCOUNTER — APPOINTMENT (OUTPATIENT)
Dept: GERIATRIC MEDICINE | Facility: CLINIC | Age: 54
End: 2024-05-14
Payer: COMMERCIAL

## 2024-05-21 ENCOUNTER — APPOINTMENT (OUTPATIENT)
Dept: PRIMARY CARE | Facility: CLINIC | Age: 54
End: 2024-05-21
Payer: COMMERCIAL

## 2024-05-30 DIAGNOSIS — M43.17 ACQUIRED SPONDYLOLISTHESIS OF LUMBOSACRAL REGION: ICD-10-CM

## 2024-05-30 DIAGNOSIS — M47.27 LUMBOSACRAL SPONDYLOSIS WITH RADICULOPATHY: ICD-10-CM

## 2024-05-30 DIAGNOSIS — M54.16 LUMBAR RADICULOPATHY: ICD-10-CM

## 2024-05-31 RX ORDER — PREDNISONE 10 MG/1
TABLET ORAL
Qty: 64 TABLET | Refills: 0 | OUTPATIENT
Start: 2024-05-31

## 2024-06-05 ENCOUNTER — APPOINTMENT (OUTPATIENT)
Dept: PRIMARY CARE | Facility: CLINIC | Age: 54
End: 2024-06-05
Payer: COMMERCIAL

## 2024-06-11 ENCOUNTER — OFFICE VISIT (OUTPATIENT)
Dept: PRIMARY CARE | Facility: CLINIC | Age: 54
End: 2024-06-11
Payer: COMMERCIAL

## 2024-06-11 VITALS
OXYGEN SATURATION: 95 % | WEIGHT: 290 LBS | BODY MASS INDEX: 54.75 KG/M2 | SYSTOLIC BLOOD PRESSURE: 144 MMHG | HEART RATE: 89 BPM | DIASTOLIC BLOOD PRESSURE: 81 MMHG | HEIGHT: 61 IN

## 2024-06-11 DIAGNOSIS — M79.89 LEG SWELLING: Primary | ICD-10-CM

## 2024-06-11 DIAGNOSIS — R07.89 OTHER CHEST PAIN: ICD-10-CM

## 2024-06-11 DIAGNOSIS — R06.02 SOBOE (SHORTNESS OF BREATH ON EXERTION): ICD-10-CM

## 2024-06-11 DIAGNOSIS — R63.5 WEIGHT GAIN, ABNORMAL: ICD-10-CM

## 2024-06-11 PROCEDURE — 99205 OFFICE O/P NEW HI 60 MIN: CPT | Performed by: FAMILY MEDICINE

## 2024-06-11 PROCEDURE — 93000 ELECTROCARDIOGRAM COMPLETE: CPT | Performed by: FAMILY MEDICINE

## 2024-06-11 PROCEDURE — 1036F TOBACCO NON-USER: CPT | Performed by: FAMILY MEDICINE

## 2024-06-11 PROCEDURE — 96372 THER/PROPH/DIAG INJ SC/IM: CPT | Performed by: FAMILY MEDICINE

## 2024-06-11 RX ORDER — HYDROXYZINE HYDROCHLORIDE 25 MG/1
25 TABLET, FILM COATED ORAL NIGHTLY
COMMUNITY
Start: 2024-06-10

## 2024-06-11 RX ORDER — FUROSEMIDE 10 MG/ML
20 INJECTION INTRAMUSCULAR; INTRAVENOUS ONCE
Status: COMPLETED | OUTPATIENT
Start: 2024-06-11 | End: 2024-06-11

## 2024-06-11 RX ORDER — CLOBETASOL PROPIONATE 0.5 MG/G
CREAM TOPICAL 2 TIMES DAILY
COMMUNITY
Start: 2024-02-26

## 2024-06-11 RX ORDER — ALBUTEROL SULFATE 90 UG/1
2 AEROSOL, METERED RESPIRATORY (INHALATION) EVERY 4 HOURS PRN
Qty: 8.5 G | Refills: 5 | Status: SHIPPED | OUTPATIENT
Start: 2024-06-11 | End: 2025-06-11

## 2024-06-11 RX ADMIN — FUROSEMIDE 20 MG: 10 INJECTION INTRAMUSCULAR; INTRAVENOUS at 14:54

## 2024-06-11 ASSESSMENT — PATIENT HEALTH QUESTIONNAIRE - PHQ9
5. POOR APPETITE OR OVEREATING: SEVERAL DAYS
4. FEELING TIRED OR HAVING LITTLE ENERGY: SEVERAL DAYS
2. FEELING DOWN, DEPRESSED OR HOPELESS: SEVERAL DAYS
SUM OF ALL RESPONSES TO PHQ9 QUESTIONS 1 AND 2: 2
1. LITTLE INTEREST OR PLEASURE IN DOING THINGS: SEVERAL DAYS
SUM OF ALL RESPONSES TO PHQ QUESTIONS 1-9: 8
9. THOUGHTS THAT YOU WOULD BE BETTER OFF DEAD, OR OF HURTING YOURSELF: NOT AT ALL
8. MOVING OR SPEAKING SO SLOWLY THAT OTHER PEOPLE COULD HAVE NOTICED. OR THE OPPOSITE, BEING SO FIGETY OR RESTLESS THAT YOU HAVE BEEN MOVING AROUND A LOT MORE THAN USUAL: NOT AT ALL
6. FEELING BAD ABOUT YOURSELF - OR THAT YOU ARE A FAILURE OR HAVE LET YOURSELF OR YOUR FAMILY DOWN: SEVERAL DAYS
10. IF YOU CHECKED OFF ANY PROBLEMS, HOW DIFFICULT HAVE THESE PROBLEMS MADE IT FOR YOU TO DO YOUR WORK, TAKE CARE OF THINGS AT HOME, OR GET ALONG WITH OTHER PEOPLE: SOMEWHAT DIFFICULT
3. TROUBLE FALLING OR STAYING ASLEEP OR SLEEPING TOO MUCH: NEARLY EVERY DAY
7. TROUBLE CONCENTRATING ON THINGS, SUCH AS READING THE NEWSPAPER OR WATCHING TELEVISION: NOT AT ALL

## 2024-06-11 ASSESSMENT — ENCOUNTER SYMPTOMS
DEPRESSION: 0
LOSS OF SENSATION IN FEET: 1
OCCASIONAL FEELINGS OF UNSTEADINESS: 1

## 2024-06-11 NOTE — PROGRESS NOTES
Subjective   Patient ID: Li Allison is a 53 y.o. female [id number] who presents for [facility] (New patient, bilateral leg swelling, SOBOE, HTN).    HPI     Pt is here to [facility].Seen a primary care in last 5-year.  Patient noticing increasing shortness of breath on exertion after 10- 15 steps noticing increasing leg swelling of bilateral lower leg for last 2 to 3-week.  Patient is feeling tired and exhausted unable to sleep having orthopnea and fatigue.  Some chest pain intermittently.  Patient again 20 to 30 pounds in last few weeks.  Patient unsure of history of heart problem or renal problem or liver problem.  Have not seen a doctor for a while.    Following Dr. Reagan for Back pain and Hip pain [facility]. Taking prednisone and meloxicam.Patient on and off of oral prednisone for last few month.  Last prescribed was a month ago.    Geeting increasing Anxiety, currently sleeping due to short of breath , requiring  albuterol multiple times a day          Immunization History   Administered Date(s) Administered    Pfizer Purple Cap SARS-CoV-2 04/13/2021, 05/08/2021       Past Medical History:   Diagnosis Date    Anxiety     Arthritis     Hypertension        Social History     Tobacco Use    Smoking status: Never    Smokeless tobacco: Never   Vaping Use    Vaping status: Never Used   Substance Use Topics    Alcohol use: Never    Drug use: Never       Family History   Problem Relation Name Age of Onset    Breast cancer Mother      Lung cancer Father      Lung cancer Maternal Grandmother      Lung cancer Maternal Grandfather      Lung cancer Paternal Grandmother      Lung cancer Paternal Grandfather         Recent Surgeries in Family Medicine            No cases to display            Current Outpatient Medications   Medication Sig Dispense Refill    brompheniramine-pseudoeph-DM 2-30-10 mg/5 mL syrup TAKE 10 ML Every 6 hours PRN for cough, sinus congestion      clobetasol (Temovate) 0.05 % cream Apply topically  "2 times a day.      hydrOXYzine HCL (Atarax) 25 mg tablet Take 1 tablet (25 mg) by mouth once daily at bedtime.      cyclobenzaprine (Flexeril) 10 mg tablet TAKE 1 TABLET (10 MG) BY MOUTH 3 TIMES A DAY AS NEEDED FOR MUSCLE SPASMS FOR UP TO 10 DAYS. 30 tablet 0    fluticasone (Flonase) 50 mcg/actuation nasal spray Administer 2 sprays into each nostril once daily.      meloxicam (Mobic) 15 mg tablet Take 1 tablet (15 mg) by mouth once daily. (Patient not taking: Reported on 6/11/2024) 90 tablet 3    predniSONE (Deltasone) 10 mg tablet Please take after breakfast: 6 TABS X2 DAYS, 5 TABS X2 DAYS, 4 TABS X 2 DAYS, 3 TABS X 2 DAYS, 2 TABS X7 DAYS, 1 TAB X14 DAYS (Patient not taking: Reported on 6/11/2024) 64 tablet 0    predniSONE (Deltasone) 10 mg tablet 6 TABS DAILY X 2 DAYS, 5 TABS DAILY X 2 , 4 DAILY X 2 , 3 DAILY X 2, 2 DAILY X 7 , 1 DAILY X 2 WEEKS (Patient not taking: Reported on 6/11/2024) 64 tablet 0    predniSONE (Deltasone) 10 mg tablet Please take as directed after lunch: 6 TABS X 2 DAYS, 5 TABS X 2 DAYS, 4 TABS X 2 DAYS, 3 TABS X  2 DAYS, 2 TABS X 7 DAYS, 1 TAB  X  2 WEEKS (Patient not taking: Reported on 6/11/2024) 64 tablet 0     No current facility-administered medications for this visit.       Physical Exam  /81   Pulse 89   Ht 1.549 m (5' 1\")   Wt 132 kg (290 lb)   SpO2 95%   BMI 54.80 kg/m²     Allergies   Allergen Reactions    Methylprednisolone GI Upset    Penicillins Unknown and Rash       General Appearance:  Alert, cooperative, no distress,   Head:  Normocephalic, atraumatic   Eyes:  PERRL, conjunctiva/corneas clear, EOM's intact,    Lungs:   Clear to auscultation bilaterally, respirations unlabored   Heart:  Regular rate and rhythm, S1 and S2 normal, no murmur,    Abdomen:   Soft, non-tender, bowel sounds active all four quadrants,  no masses, no organomegaly   Extremities: Extremities normal, b/l Lower leg edema ++   Neurologic: Normal        Assessment/Plan   Diagnoses and all orders " for this visit:  Leg swelling  -     D-dimer, quantitative; Future  -     B-type natriuretic peptide; Future  -     CBC and Auto Differential; Future  -     Urine Culture; Future  SOBOE (shortness of breath on exertion)  -     Comprehensive Metabolic Panel; Future  -     Urinalysis with Reflex Microscopic; Future  -     D-dimer, quantitative; Future  -     B-type natriuretic peptide; Future  -     ECG 12 lead (Clinic Performed)  -     XR chest 2 views; Future  -     Urine Culture; Future  Weight gain, abnormal  -     Lipid Panel; Future  -     Urine Culture; Future  Other chest pain  -     Vitamin B12; Future  -     ECG 12 lead (Clinic Performed)    Patient was a advised to go to ER for concern for possible acute congestive heart failure versus renal failure.  Worsening congestive heart failure and worsening short of breath and probable death also informed to the patient.   Patient reluctant to go to hospital signed AMA papers.    Patient is considering outpatient lab work and EKG with echocardiogram.  Will get stat labs  Lasix 20 mg IM given to the patient  Follow-up tomorrow    I spent  60 minutes clinical time with this patient. greater than 50% of this time was spent in counseling and or coordination of care.

## 2024-06-12 ENCOUNTER — APPOINTMENT (OUTPATIENT)
Dept: PRIMARY CARE | Facility: CLINIC | Age: 54
End: 2024-06-12
Payer: COMMERCIAL

## 2024-06-12 ENCOUNTER — OFFICE VISIT (OUTPATIENT)
Dept: PRIMARY CARE | Facility: CLINIC | Age: 54
End: 2024-06-12
Payer: COMMERCIAL

## 2024-06-12 VITALS
DIASTOLIC BLOOD PRESSURE: 62 MMHG | SYSTOLIC BLOOD PRESSURE: 186 MMHG | HEART RATE: 62 BPM | HEIGHT: 61 IN | BODY MASS INDEX: 54.8 KG/M2

## 2024-06-12 DIAGNOSIS — R63.5 WEIGHT GAIN, ABNORMAL: ICD-10-CM

## 2024-06-12 DIAGNOSIS — D50.8 OTHER IRON DEFICIENCY ANEMIA: ICD-10-CM

## 2024-06-12 DIAGNOSIS — M79.89 LEG SWELLING: Primary | ICD-10-CM

## 2024-06-12 DIAGNOSIS — N39.0 ACUTE UTI: ICD-10-CM

## 2024-06-12 DIAGNOSIS — R06.02 SOBOE (SHORTNESS OF BREATH ON EXERTION): ICD-10-CM

## 2024-06-12 DIAGNOSIS — R79.89 ELEVATED D-DIMER: ICD-10-CM

## 2024-06-12 PROBLEM — D50.9 IRON DEFICIENCY ANEMIA: Status: ACTIVE | Noted: 2024-06-12

## 2024-06-12 RX ORDER — FUROSEMIDE 10 MG/ML
20 INJECTION INTRAMUSCULAR; INTRAVENOUS ONCE
Status: COMPLETED | OUTPATIENT
Start: 2024-06-12 | End: 2024-06-12

## 2024-06-12 RX ORDER — FUROSEMIDE 10 MG/ML
40 INJECTION INTRAMUSCULAR; INTRAVENOUS ONCE
Status: DISCONTINUED | OUTPATIENT
Start: 2024-06-12 | End: 2024-06-12

## 2024-06-12 RX ORDER — SULFAMETHOXAZOLE AND TRIMETHOPRIM 800; 160 MG/1; MG/1
1 TABLET ORAL 2 TIMES DAILY
Qty: 14 TABLET | Refills: 0 | Status: SHIPPED | OUTPATIENT
Start: 2024-06-12 | End: 2024-06-19

## 2024-06-12 NOTE — PROGRESS NOTES
Subjective   Patient ID: Li Allison [id number] is a 53 y.o. female who presents for Follow-up (Follow up Lasix shot).    HPI   Patient is here for test result  Elevated D-dimer 1398 patient was advised to go to emergency room last night for possible admission for DVT and PE.  Patient declined going to ER.  Patient is here to discuss other lab work.  Concern for UTI need for antibiotic discussed with the patient.  Patient is agreeable with oral antibiotic  Patient got Lasix shot yesterday tolerated it well.  Continue to have leg swelling and shortness of breath on exertion.  Patient was advised to go to [facility] ER for possible admission patient continued to decline going to ER as she need to take care of her kids.  Patient cannot go to hospital before yesterday morning per patient patient does understand increased risk of life-threatening emergency or death with possible DVT and PE patient continued to deny going to ER.    Patient taking multiple tablet of meloxicam and ibuprofen for controlling back pain for last few month.  Concern for anemia and possible GI bleed discussed with the patient.    Social History     Tobacco Use    Smoking status: Never    Smokeless tobacco: Never   Vaping Use    Vaping status: Never Used   Substance Use Topics    Alcohol use: Never    Drug use: Never       Allergies   Allergen Reactions    Methylprednisolone GI Upset    Penicillins Unknown and Rash       Current Outpatient Medications   Medication Sig Dispense Refill    albuterol (ProAir HFA) 90 mcg/actuation inhaler Inhale 2 puffs every 4 hours if needed for wheezing or shortness of breath. 8.5 g 5    brompheniramine-pseudoeph-DM 2-30-10 mg/5 mL syrup TAKE 10 ML Every 6 hours PRN for cough, sinus congestion      clobetasol (Temovate) 0.05 % cream Apply topically 2 times a day.      fluticasone (Flonase) 50 mcg/actuation nasal spray Administer 2 sprays into each nostril once daily.      hydrOXYzine HCL (Atarax) 25 mg tablet Take 1  "tablet (25 mg) by mouth once daily at bedtime.      cyclobenzaprine (Flexeril) 10 mg tablet TAKE 1 TABLET (10 MG) BY MOUTH 3 TIMES A DAY AS NEEDED FOR MUSCLE SPASMS FOR UP TO 10 DAYS. 30 tablet 0    predniSONE (Deltasone) 10 mg tablet Please take after breakfast: 6 TABS X2 DAYS, 5 TABS X2 DAYS, 4 TABS X 2 DAYS, 3 TABS X 2 DAYS, 2 TABS X7 DAYS, 1 TAB X14 DAYS (Patient not taking: Reported on 6/12/2024) 64 tablet 0    predniSONE (Deltasone) 10 mg tablet 6 TABS DAILY X 2 DAYS, 5 TABS DAILY X 2 , 4 DAILY X 2 , 3 DAILY X 2, 2 DAILY X 7 , 1 DAILY X 2 WEEKS (Patient not taking: Reported on 6/11/2024) 64 tablet 0     No current facility-administered medications for this visit.       Physical Exam  BP (!) 186/62   Pulse 62   Ht 1.549 m (5' 1\")   BMI 54.80 kg/m²     General Appearance:  Alert, cooperative, no distress,   Head:  Normocephalic, atraumatic   Eyes:  PERRL, conjunctiva/corneas clear, EOM's intact,    Lungs:   Clear to auscultation bilaterally, respirations unlabored   Heart:  Regular rate and rhythm, S1 and S2 normal, no murmur,    Neurologic: Normal      No visits with results within 3 Month(s) from this visit.   Latest known visit with results is:   No results found for any previous visit.       Assessment/Plan   Diagnoses and all orders for this visit:  Leg swelling  SOBOE (shortness of breath on exertion)  Weight gain, abnormal  Elevated d-dimer  Other iron deficiency anemia  Acute UTI  -     sulfamethoxazole-trimethoprim (Bactrim DS) 800-160 mg tablet; Take 1 tablet by mouth 2 times a day for 7 days.    Patient reluctant to go to ER.  Understand increased risk of life-threatening emergency with acute DVT and PE and possible death.  Patient mentioned that she will consider going to ER tomorrow.  Willing to take risk of staying home today.  Lasix 40 a.m. given in the office  Patient was advised to get stat CT PE and DVT scan  patient declined and left the office.    I spent  45 minutes clinical time with " this patient. greater than 50% of this time was spent in counseling and or coordination of care.

## 2024-06-13 ENCOUNTER — TELEPHONE (OUTPATIENT)
Dept: PRIMARY CARE | Facility: CLINIC | Age: 54
End: 2024-06-13

## 2024-06-13 DIAGNOSIS — R60.9 EDEMA, UNSPECIFIED TYPE: ICD-10-CM

## 2024-06-19 ENCOUNTER — OFFICE VISIT (OUTPATIENT)
Dept: PRIMARY CARE | Facility: CLINIC | Age: 54
End: 2024-06-19
Payer: COMMERCIAL

## 2024-06-19 VITALS
BODY MASS INDEX: 53.73 KG/M2 | SYSTOLIC BLOOD PRESSURE: 144 MMHG | WEIGHT: 284.6 LBS | HEART RATE: 88 BPM | HEIGHT: 61 IN | OXYGEN SATURATION: 94 % | DIASTOLIC BLOOD PRESSURE: 77 MMHG

## 2024-06-19 DIAGNOSIS — G89.29 CHRONIC MIDLINE LOW BACK PAIN WITHOUT SCIATICA: ICD-10-CM

## 2024-06-19 DIAGNOSIS — M79.89 LEG SWELLING: Primary | ICD-10-CM

## 2024-06-19 DIAGNOSIS — M54.50 LOW BACK PAIN WITHOUT SCIATICA, UNSPECIFIED BACK PAIN LATERALITY, UNSPECIFIED CHRONICITY: ICD-10-CM

## 2024-06-19 DIAGNOSIS — F41.9 ANXIETY: ICD-10-CM

## 2024-06-19 DIAGNOSIS — M54.50 CHRONIC MIDLINE LOW BACK PAIN WITHOUT SCIATICA: ICD-10-CM

## 2024-06-19 DIAGNOSIS — N39.0 ACUTE UTI: ICD-10-CM

## 2024-06-19 DIAGNOSIS — I51.7 LVH (LEFT VENTRICULAR HYPERTROPHY): ICD-10-CM

## 2024-06-19 DIAGNOSIS — R06.02 SOBOE (SHORTNESS OF BREATH ON EXERTION): ICD-10-CM

## 2024-06-19 PROCEDURE — 1036F TOBACCO NON-USER: CPT | Performed by: FAMILY MEDICINE

## 2024-06-19 PROCEDURE — 99215 OFFICE O/P EST HI 40 MIN: CPT | Performed by: FAMILY MEDICINE

## 2024-06-19 RX ORDER — CIPROFLOXACIN HYDROCHLORIDE 500 MG/1
500 TABLET, FILM COATED ORAL 2 TIMES DAILY
COMMUNITY
Start: 2024-06-14 | End: 2024-06-21

## 2024-06-19 RX ORDER — TRAMADOL HYDROCHLORIDE 50 MG/1
50 TABLET ORAL EVERY 6 HOURS PRN
Qty: 20 TABLET | Refills: 0 | Status: SHIPPED | OUTPATIENT
Start: 2024-06-19 | End: 2024-06-26

## 2024-06-19 RX ORDER — DULOXETIN HYDROCHLORIDE 30 MG/1
30 CAPSULE, DELAYED RELEASE ORAL DAILY
Qty: 90 CAPSULE | Refills: 0 | Status: SHIPPED | OUTPATIENT
Start: 2024-06-19 | End: 2025-06-19

## 2024-06-19 RX ORDER — POTASSIUM CHLORIDE 750 MG/1
10 CAPSULE, EXTENDED RELEASE ORAL DAILY
COMMUNITY
Start: 2024-06-14 | End: 2024-06-19 | Stop reason: SDUPTHER

## 2024-06-19 RX ORDER — FUROSEMIDE 40 MG/1
40 TABLET ORAL DAILY
COMMUNITY
Start: 2024-06-14 | End: 2024-06-19 | Stop reason: SDUPTHER

## 2024-06-19 RX ORDER — FUROSEMIDE 40 MG/1
40 TABLET ORAL DAILY
Qty: 7 TABLET | Refills: 0 | Status: SHIPPED | OUTPATIENT
Start: 2024-06-19 | End: 2024-06-26

## 2024-06-19 RX ORDER — POTASSIUM CHLORIDE 750 MG/1
10 CAPSULE, EXTENDED RELEASE ORAL DAILY
Qty: 7 CAPSULE | Refills: 0 | Status: SHIPPED | OUTPATIENT
Start: 2024-06-19 | End: 2024-06-26

## 2024-06-19 ASSESSMENT — PATIENT HEALTH QUESTIONNAIRE - PHQ9
SUM OF ALL RESPONSES TO PHQ9 QUESTIONS 1 AND 2: 0
1. LITTLE INTEREST OR PLEASURE IN DOING THINGS: NOT AT ALL
2. FEELING DOWN, DEPRESSED OR HOPELESS: NOT AT ALL

## 2024-06-19 NOTE — PROGRESS NOTES
Subjective   Patient ID: Li Allison [id number] is a 53 y.o. female who presents for Hospital Follow-up (Hospital follow up ).      Pt is here for hospital discharge follow-up.     Pt was  admitted to [facility]  on 6/13/2024 for increased shortness of breath leg swelling and weight gain concerning for congestive heart failure with elevated D-dimer 1394 and acute UTI.  Patient had bilateral DVT scan which was negative.  Patient declined CT PE treated with IV Cipro for UTI.  Cardiology and hematology oncology was consulted.  Patient was recommended to have IV Lasix and CT PE which she declined. ECHO with moderate left ventricular hypertrophy. Patient was doing much better on 6/14/2024 was discharged home with p.o. Lasix 40 mg daily advised to stop meloxicam and prednisone for back pain and improved swelling.  Advised to follow-up with PCP and cardiology.    Feeling better.  Denies shortness of breath or chest pain.  Energy level is better.  Lost 6 to 7 pounds in last 1 week.  Leg swelling is improving.  Pt noticing  back and hip pain on R side. Pt has discontinued Meloxicam and is looking for a different medication for pain. Pt has tried Tylenol arthritis, Tylenol,  and states relief was not as good as Meloxicam. Describes as a nerve pain down back of R hip and goes down front of R thigh to R knee. Currently rates severity of pain 10/10. States she wants to be more active but cannot because of the pain limiting her physical activity. On chronic oral prednisone for more than 6-month.  Want to go off of it due to increased swelling and weight gain. Hesitant to try past pain medications again due to past fluid overload secondary to Meloxicam and steroid use.    Pt also endorses issues with sleeping. Pt taking hydroxyzine, keeps pt asleep for 1-2 hours. Pt awakes after 1-2 hours and walks around. Pt would like a different medication to help her sleep. Continue to c/o increased Anxiety  and thought raising.     Acute  UTI - patient taking Cipro twice daily.  Feeling better.        Social History     Tobacco Use    Smoking status: Never    Smokeless tobacco: Never   Vaping Use    Vaping status: Never Used   Substance Use Topics    Alcohol use: Never    Drug use: Never       Allergies   Allergen Reactions    Methylprednisolone GI Upset    Penicillins Unknown and Rash       Current Outpatient Medications   Medication Sig Dispense Refill    albuterol (ProAir HFA) 90 mcg/actuation inhaler Inhale 2 puffs every 4 hours if needed for wheezing or shortness of breath. 8.5 g 5    Cipro 500 mg tablet Take 1 tablet (500 mg) by mouth 2 times a day.      clobetasol (Temovate) 0.05 % cream Apply topically 2 times a day.      hydrOXYzine HCL (Atarax) 25 mg tablet Take 1 tablet (25 mg) by mouth once daily at bedtime.      Lasix 40 mg tablet Take 1 tablet (40 mg) by mouth once daily.      potassium chloride ER (Micro-K) 10 mEq ER capsule Take 1 capsule (10 mEq) by mouth once daily.      sulfamethoxazole-trimethoprim (Bactrim DS) 800-160 mg tablet Take 1 tablet by mouth 2 times a day for 7 days. 14 tablet 0    brompheniramine-pseudoeph-DM 2-30-10 mg/5 mL syrup TAKE 10 ML Every 6 hours PRN for cough, sinus congestion      cyclobenzaprine (Flexeril) 10 mg tablet TAKE 1 TABLET (10 MG) BY MOUTH 3 TIMES A DAY AS NEEDED FOR MUSCLE SPASMS FOR UP TO 10 DAYS. 30 tablet 0    fluticasone (Flonase) 50 mcg/actuation nasal spray Administer 2 sprays into each nostril once daily.      predniSONE (Deltasone) 10 mg tablet Please take after breakfast: 6 TABS X2 DAYS, 5 TABS X2 DAYS, 4 TABS X 2 DAYS, 3 TABS X 2 DAYS, 2 TABS X7 DAYS, 1 TAB X14 DAYS (Patient not taking: Reported on 6/12/2024) 64 tablet 0    predniSONE (Deltasone) 10 mg tablet 6 TABS DAILY X 2 DAYS, 5 TABS DAILY X 2 , 4 DAILY X 2 , 3 DAILY X 2, 2 DAILY X 7 , 1 DAILY X 2 WEEKS (Patient not taking: Reported on 6/11/2024) 64 tablet 0     No current facility-administered medications for this visit.  "      Physical Exam  /77   Pulse 88   Ht 1.549 m (5' 1\")   Wt 129 kg (284 lb 9.6 oz)   SpO2 94%   BMI 53.77 kg/m²     General Appearance:  Alert, cooperative, no distress,   Head:  Normocephalic, atraumatic   Eyes:  PERRL, conjunctiva/corneas clear, EOM's intact,    Lungs:   Clear to auscultation bilaterally, respirations unlabored   Heart:  Regular rate and rhythm, S1 and S2 normal, no murmur,    Neurologic: Normal      No visits with results within 3 Month(s) from this visit.   Latest known visit with results is:   No results found for any previous visit.       Assessment/Plan   Diagnoses and all orders for this visit:  Li was seen today for hospital follow-up.  Diagnoses and all orders for this visit:  Leg swelling (Primary)  -     Lasix 40 mg tablet; Take 1 tablet (40 mg) by mouth once daily for 7 days.  -     potassium chloride ER (Micro-K) 10 mEq ER capsule; Take 1 capsule (10 mEq) by mouth once daily for 7 days.  -     Comprehensive Metabolic Panel; Future  SOBOE (shortness of breath on exertion)  -     Lasix 40 mg tablet; Take 1 tablet (40 mg) by mouth once daily for 7 days.  -     potassium chloride ER (Micro-K) 10 mEq ER capsule; Take 1 capsule (10 mEq) by mouth once daily for 7 days.  -     Comprehensive Metabolic Panel; Future  LVH (left ventricular hypertrophy)  Acute UTI  -     CBC and Auto Differential; Future  Anxiety  -     DULoxetine (Cymbalta) 30 mg DR capsule; Take 1 capsule (30 mg) by mouth once daily. Do not crush or chew.  Low back pain without sciatica, unspecified back pain laterality, unspecified chronicity  -     Referral to Pain Medicine; Future  Chronic midline low back pain without sciatica  -     traMADol (Ultram) 50 mg tablet; Take 1 tablet (50 mg) by mouth every 6 hours if needed for severe pain (7 - 10) for up to 7 days.      Bilateral lower leg swelling with shortness of breath  Concern for  diastolic congestive heart failure  Echocardiogram reviewed with left " ventricular hypertrophy  Continue Lasix 40 mg daily for now  Follow-up cardiology  Continue potassium 10 mg  Will check CBC BMP  Chronic low back pain  Patient off meloxicam and oral prednisone due to weight gain and leg swelling  Advised to continue Tylenol arthritis 3 times a day  Will give tramadol for short-term relief  Will refer to pain management    Anxiety  Will start Cymbalta 30 mg for anxiety and pain control  Follow-up in 1 month      Will discuss about anemia and further testing for anemia next visit.    patient was recently seen in the hospital and patient's hospital record has been reviewed with the patient including, but not limited to, discharge summary, labs, imaging, consultation notes (if pertinent). Ambulatory medication list and discharge hospitalization list has been compared and updated for changes.

## 2024-06-26 RX ORDER — FUROSEMIDE 40 MG/1
40 TABLET ORAL DAILY
Qty: 30 TABLET | Refills: 0 | Status: CANCELLED | OUTPATIENT
Start: 2024-06-26 | End: 2025-06-26

## 2024-06-26 NOTE — TELEPHONE ENCOUNTER
PT will take the Tramadol.  She doesn't want to come in for an appointment to be able to get something stronger.          Only has two pills left of Lasix.  Please send in a refill.    Lasix 40 mg tablet

## 2024-06-26 NOTE — TELEPHONE ENCOUNTER
PT IS A PT OF DR LOWE. SHE HAS BEEN TAKING TRAMADOL FOR HIP PAIN, AND NEEDS A REFILL, BUT SHE FEELS SHE NEEDS SOMETHING ELSE FOR PAIN AND THAT THE TRAMADOL IS NOT HELPING, THANK YOU.

## 2024-06-27 DIAGNOSIS — M54.16 LUMBAR RADICULOPATHY: ICD-10-CM

## 2024-06-27 DIAGNOSIS — F41.9 ANXIETY: ICD-10-CM

## 2024-06-27 DIAGNOSIS — M47.27 LUMBOSACRAL SPONDYLOSIS WITH RADICULOPATHY: ICD-10-CM

## 2024-06-27 DIAGNOSIS — M43.17 ACQUIRED SPONDYLOLISTHESIS OF LUMBOSACRAL REGION: ICD-10-CM

## 2024-06-27 RX ORDER — FUROSEMIDE 40 MG/1
40 TABLET ORAL DAILY
Qty: 7 TABLET | Refills: 0 | Status: SHIPPED | OUTPATIENT
Start: 2024-06-27 | End: 2024-07-04

## 2024-06-28 ENCOUNTER — OFFICE VISIT (OUTPATIENT)
Dept: PRIMARY CARE | Facility: CLINIC | Age: 54
End: 2024-06-28
Payer: COMMERCIAL

## 2024-06-28 VITALS
WEIGHT: 270 LBS | OXYGEN SATURATION: 95 % | SYSTOLIC BLOOD PRESSURE: 113 MMHG | HEART RATE: 89 BPM | HEIGHT: 62 IN | BODY MASS INDEX: 49.69 KG/M2 | DIASTOLIC BLOOD PRESSURE: 68 MMHG

## 2024-06-28 DIAGNOSIS — D50.9 IRON DEFICIENCY ANEMIA, UNSPECIFIED IRON DEFICIENCY ANEMIA TYPE: ICD-10-CM

## 2024-06-28 DIAGNOSIS — M79.89 LEG SWELLING: ICD-10-CM

## 2024-06-28 DIAGNOSIS — R06.02 SOBOE (SHORTNESS OF BREATH ON EXERTION): ICD-10-CM

## 2024-06-28 DIAGNOSIS — R52 UNCONTROLLED PAIN: ICD-10-CM

## 2024-06-28 DIAGNOSIS — R60.9 EDEMA, UNSPECIFIED TYPE: Primary | ICD-10-CM

## 2024-06-28 PROCEDURE — 1036F TOBACCO NON-USER: CPT | Performed by: EMERGENCY MEDICINE

## 2024-06-28 PROCEDURE — 99214 OFFICE O/P EST MOD 30 MIN: CPT | Performed by: EMERGENCY MEDICINE

## 2024-06-28 RX ORDER — PREDNISONE 10 MG/1
TABLET ORAL
Qty: 64 TABLET | Refills: 0 | Status: SHIPPED | OUTPATIENT
Start: 2024-06-28

## 2024-06-28 RX ORDER — SPIRONOLACTONE 50 MG/1
100 TABLET, FILM COATED ORAL DAILY
Qty: 20 TABLET | Refills: 0 | Status: SHIPPED | OUTPATIENT
Start: 2024-06-28 | End: 2024-07-08

## 2024-06-28 RX ORDER — TORSEMIDE 20 MG/1
40 TABLET ORAL DAILY
Qty: 20 TABLET | Refills: 0 | Status: SHIPPED | OUTPATIENT
Start: 2024-06-28 | End: 2024-07-08

## 2024-06-28 RX ORDER — GABAPENTIN 100 MG/1
100 CAPSULE ORAL 3 TIMES DAILY
Qty: 90 CAPSULE | Refills: 0 | Status: SHIPPED | OUTPATIENT
Start: 2024-06-28 | End: 2025-06-28

## 2024-06-28 NOTE — PROGRESS NOTES
Subjective   Patient ID: Li Allison [id number] is a 53 y.o. female who presents for back pain.    HPI  Pt is here for back pain    Patient presents with ongoing back pain for over a year.    Patient has peripheral edema    PRIOR HISTORY -    Pt was  admitted to [facility]  on 6/13/2024 for increased shortness of breath leg swelling and weight gain concerning for congestive heart failure with elevated D-dimer 1394 and acute UTI.  Patient had bilateral DVT scan which was negative.  Patient declined CT PE treated with IV Cipro for UTI.  Cardiology and hematology oncology was consulted.  Patient was recommended to have IV Lasix and CT PE which she declined. ECHO with moderate left ventricular hypertrophy. Patient was doing much better on 6/14/2024 was discharged home with p.o. Lasix 40 mg daily advised to stop meloxicam and prednisone for back pain and improved swelling.  Advised to follow-up with PCP and cardiology.    Feeling better.  Denies shortness of breath or chest pain.  Energy level is better.  Lost 6 to 7 pounds in last 1 week.  Leg swelling is improving.  Pt noticing  back and hip pain on R side. Pt has discontinued Meloxicam and is looking for a different medication for pain. Pt has tried Tylenol arthritis, Tylenol,  and states relief was not as good as Meloxicam. Describes as a nerve pain down back of R hip and goes down front of R thigh to R knee. Currently rates severity of pain 10/10. States she wants to be more active but cannot because of the pain limiting her physical activity. On chronic oral prednisone for more than 6-month.  Want to go off of it due to increased swelling and weight gain. Hesitant to try past pain medications again due to past fluid overload secondary to Meloxicam and steroid use.    Pt also endorses issues with sleeping. Pt taking hydroxyzine, keeps pt asleep for 1-2 hours. Pt awakes after 1-2 hours and walks around. Pt would like a different medication to help her sleep.  "Continue to c/o increased Anxiety  and thought raising.     Acute UTI - patient taking Cipro twice daily.  Feeling better.        Social History     Tobacco Use    Smoking status: Never    Smokeless tobacco: Never   Vaping Use    Vaping status: Never Used   Substance Use Topics    Alcohol use: Never    Drug use: Never       Allergies   Allergen Reactions    Methylprednisolone GI Upset    Penicillins Unknown and Rash       Current Outpatient Medications   Medication Sig Dispense Refill    albuterol (ProAir HFA) 90 mcg/actuation inhaler Inhale 2 puffs every 4 hours if needed for wheezing or shortness of breath. 8.5 g 5    clobetasol (Temovate) 0.05 % cream Apply topically 2 times a day.      DULoxetine (Cymbalta) 30 mg DR capsule Take 1 capsule (30 mg) by mouth once daily. Do not crush or chew. 90 capsule 0    fluticasone (Flonase) 50 mcg/actuation nasal spray Administer 2 sprays into each nostril once daily.      furosemide (Lasix) 40 mg tablet Take 1 tablet (40 mg) by mouth once daily for 7 days. 7 tablet 0    hydrOXYzine HCL (Atarax) 25 mg tablet Take 1 tablet (25 mg) by mouth once daily at bedtime.      predniSONE (Deltasone) 10 mg tablet PLEASE SEE ATTACHED FOR DETAILED DIRECTIONS 64 tablet 0    cyclobenzaprine (Flexeril) 10 mg tablet TAKE 1 TABLET (10 MG) BY MOUTH 3 TIMES A DAY AS NEEDED FOR MUSCLE SPASMS FOR UP TO 10 DAYS. 30 tablet 0    Lasix 40 mg tablet Take 1 tablet (40 mg) by mouth once daily for 7 days. 7 tablet 0     No current facility-administered medications for this visit.       Physical Exam  /68   Pulse 89   Ht 1.575 m (5' 2\")   Wt 122 kg (270 lb)   SpO2 95%   BMI 49.38 kg/m²     General Appearance:  Alert, cooperative, no distress,   Head:  Normocephalic, atraumatic   Eyes:  PERRL, conjunctiva/corneas clear, EOM's intact,    Lungs:   Clear to auscultation bilaterally, respirations unlabored   Heart:  Regular rate and rhythm, S1 and S2 normal, no murmur,    Neurologic: Normal      No " visits with results within 3 Month(s) from this visit.   Latest known visit with results is:   No results found for any previous visit.       Assessment/Plan   Patient presents for back pain     Back Pain - Patients complains of back pain ongoing for over a year. She was taking tramadol, dosage completed. Prescribed gabapentin for the pain. Recommended MRI of lower back for long term treatment.    Peripheral Edema - Patient has lower leg swelling, was previously on lasix. Lasix discontinued, we prescribed water pills. Patient was counseled to reduce fluid intake and to take water pills in the morning/early afternoon.      Bilateral lower leg swelling with shortness of breath  Concern for  diastolic congestive heart failure  Echocardiogram reviewed with left ventricular hypertrophy  Continue Lasix 40 mg daily for now  Follow-up cardiology  Continue potassium 10 mg  Will check CBC BMP  Chronic low back pain  Patient off meloxicam and oral prednisone due to weight gain and leg swelling  Advised to continue Tylenol arthritis 3 times a day  Will give tramadol for short-term relief  Will refer to pain management    Anxiety  Will start Cymbalta 30 mg for anxiety and pain control  Follow-up in 1 month      Will discuss about anemia and further testing for anemia next visit.    patient was recently seen in the hospital and patient's hospital record has been reviewed with the patient including, but not limited to, discharge summary, labs, imaging, consultation notes (if pertinent). Ambulatory medication list and discharge hospitalization list has been compared and updated for changes.

## 2024-07-01 RX ORDER — DULOXETIN HYDROCHLORIDE 30 MG/1
30 CAPSULE, DELAYED RELEASE ORAL DAILY
Qty: 90 CAPSULE | Refills: 0 | Status: SHIPPED | OUTPATIENT
Start: 2024-07-01 | End: 2025-07-01

## 2024-07-02 ENCOUNTER — TELEPHONE (OUTPATIENT)
Dept: PRIMARY CARE | Facility: CLINIC | Age: 54
End: 2024-07-02
Payer: COMMERCIAL

## 2024-07-02 NOTE — TELEPHONE ENCOUNTER
PT was prescribed gabapentin 100 mg, 1 capsule by mouth 3 times a day.      PT is asking if she could take 2 three times a day?    She also requested to have Dr Rader as a PCP.

## 2024-07-10 DIAGNOSIS — R60.9 EDEMA, UNSPECIFIED TYPE: ICD-10-CM

## 2024-07-10 DIAGNOSIS — M43.17 ACQUIRED SPONDYLOLISTHESIS OF LUMBOSACRAL REGION: ICD-10-CM

## 2024-07-10 DIAGNOSIS — M47.27 LUMBOSACRAL SPONDYLOSIS WITH RADICULOPATHY: ICD-10-CM

## 2024-07-10 DIAGNOSIS — M54.16 LUMBAR RADICULOPATHY: ICD-10-CM

## 2024-07-10 DIAGNOSIS — R52 UNCONTROLLED PAIN: ICD-10-CM

## 2024-07-10 RX ORDER — GABAPENTIN 100 MG/1
100 CAPSULE ORAL 3 TIMES DAILY
Qty: 90 CAPSULE | Refills: 0 | Status: SHIPPED | OUTPATIENT
Start: 2024-07-10

## 2024-07-10 RX ORDER — TORSEMIDE 20 MG/1
40 TABLET ORAL DAILY
Qty: 20 TABLET | Refills: 0 | Status: SHIPPED | OUTPATIENT
Start: 2024-07-10 | End: 2024-07-20

## 2024-07-10 RX ORDER — SPIRONOLACTONE 50 MG/1
100 TABLET, FILM COATED ORAL DAILY
Qty: 20 TABLET | Refills: 0 | Status: SHIPPED | OUTPATIENT
Start: 2024-07-10 | End: 2024-07-20

## 2024-07-11 RX ORDER — PREDNISONE 10 MG/1
TABLET ORAL
Qty: 64 TABLET | Refills: 0 | Status: SHIPPED | OUTPATIENT
Start: 2024-07-11

## 2024-07-15 ENCOUNTER — TELEPHONE (OUTPATIENT)
Dept: PRIMARY CARE | Facility: CLINIC | Age: 54
End: 2024-07-15
Payer: COMMERCIAL

## 2024-07-15 DIAGNOSIS — R52 UNCONTROLLED PAIN: ICD-10-CM

## 2024-07-15 RX ORDER — GABAPENTIN 100 MG/1
200 CAPSULE ORAL 3 TIMES DAILY
Qty: 180 CAPSULE | Refills: 1 | Status: SHIPPED | OUTPATIENT
Start: 2024-07-15

## 2024-07-15 NOTE — TELEPHONE ENCOUNTER
PT STATES PHARMACY WILL NOT REFILL MED....TOO EARLY. STATES SHE WAS TOLD SHE CAN INCREASE GABAPENTIN TO 2 PILLS 3X A DAY INSTEAD OF 1 PILL 3X A DAY. PLEASE CALL

## 2024-07-16 ENCOUNTER — TELEPHONE (OUTPATIENT)
Dept: PRIMARY CARE | Facility: CLINIC | Age: 54
End: 2024-07-16
Payer: COMMERCIAL

## 2024-07-16 DIAGNOSIS — G47.00 INSOMNIA, UNSPECIFIED TYPE: ICD-10-CM

## 2024-07-20 RX ORDER — TRAZODONE HYDROCHLORIDE 100 MG/1
100 TABLET ORAL NIGHTLY PRN
Qty: 90 TABLET | Refills: 1 | Status: SHIPPED | OUTPATIENT
Start: 2024-07-20 | End: 2025-07-20

## 2024-07-30 DIAGNOSIS — R60.9 EDEMA, UNSPECIFIED TYPE: ICD-10-CM

## 2024-07-30 DIAGNOSIS — F41.9 ANXIETY: ICD-10-CM

## 2024-07-31 RX ORDER — TORSEMIDE 20 MG/1
40 TABLET ORAL DAILY
Qty: 60 TABLET | Refills: 0 | Status: SHIPPED | OUTPATIENT
Start: 2024-07-31

## 2024-07-31 RX ORDER — SPIRONOLACTONE 50 MG/1
100 TABLET, FILM COATED ORAL DAILY
Qty: 60 TABLET | Refills: 0 | Status: SHIPPED | OUTPATIENT
Start: 2024-07-31

## 2024-07-31 RX ORDER — DULOXETIN HYDROCHLORIDE 30 MG/1
30 CAPSULE, DELAYED RELEASE ORAL DAILY
Qty: 90 CAPSULE | Refills: 1 | Status: SHIPPED | OUTPATIENT
Start: 2024-07-31 | End: 2025-07-31

## 2024-08-04 DIAGNOSIS — M54.16 LUMBAR RADICULOPATHY: ICD-10-CM

## 2024-08-04 DIAGNOSIS — M43.17 ACQUIRED SPONDYLOLISTHESIS OF LUMBOSACRAL REGION: ICD-10-CM

## 2024-08-04 DIAGNOSIS — M47.27 LUMBOSACRAL SPONDYLOSIS WITH RADICULOPATHY: ICD-10-CM

## 2024-08-05 RX ORDER — PREDNISONE 10 MG/1
TABLET ORAL
Qty: 64 TABLET | Refills: 0 | Status: SHIPPED | OUTPATIENT
Start: 2024-08-05

## 2024-08-06 ENCOUNTER — TELEPHONE (OUTPATIENT)
Dept: PRIMARY CARE | Facility: CLINIC | Age: 54
End: 2024-08-06
Payer: COMMERCIAL

## 2024-08-06 NOTE — TELEPHONE ENCOUNTER
PT CALLED AND FOR INSURANCE STATING WHY SHE WAS IN THE HOSPITAL IN THE HOSPITAL JAQUI WAS UNDER DR.DESAI RODRIGUEZ IN THE HOSPITAL       PHYSICIAN STATEMENT

## 2024-08-07 NOTE — TELEPHONE ENCOUNTER
Left message to patient stating that if she needs something from the hospital she should contact them

## 2024-08-08 NOTE — TELEPHONE ENCOUNTER
SPOKE WITH PATIENT AND SHE WILL  NOTES IN OFFICE THAT MAME PRINTED AND ALSO CONTACT MEDICAL RECORDS AT [facility]

## 2024-08-12 DIAGNOSIS — R60.9 EDEMA, UNSPECIFIED TYPE: ICD-10-CM

## 2024-08-13 RX ORDER — SPIRONOLACTONE 50 MG/1
100 TABLET, FILM COATED ORAL DAILY
Qty: 60 TABLET | Refills: 1 | Status: SHIPPED | OUTPATIENT
Start: 2024-08-13

## 2024-08-15 ENCOUNTER — TELEPHONE (OUTPATIENT)
Dept: PRIMARY CARE | Facility: CLINIC | Age: 54
End: 2024-08-15
Payer: COMMERCIAL

## 2024-08-15 DIAGNOSIS — M43.17 ACQUIRED SPONDYLOLISTHESIS OF LUMBOSACRAL REGION: ICD-10-CM

## 2024-08-15 DIAGNOSIS — M54.16 LUMBAR RADICULOPATHY: ICD-10-CM

## 2024-08-15 DIAGNOSIS — M47.27 LUMBOSACRAL SPONDYLOSIS WITH RADICULOPATHY: ICD-10-CM

## 2024-08-15 RX ORDER — PREDNISONE 10 MG/1
TABLET ORAL
Qty: 64 TABLET | Refills: 0 | Status: SHIPPED | OUTPATIENT
Start: 2024-08-15

## 2024-08-15 NOTE — TELEPHONE ENCOUNTER
Pt used to take prednisone before taking gabapentin. Says when she takes it together it helps with pain better. Would like to get a refill on prednisone

## 2024-08-16 ENCOUNTER — TELEPHONE (OUTPATIENT)
Dept: PRIMARY CARE | Facility: CLINIC | Age: 54
End: 2024-08-16

## 2024-08-23 DIAGNOSIS — M54.16 LUMBAR RADICULOPATHY: Primary | ICD-10-CM

## 2024-08-23 RX ORDER — MELOXICAM 15 MG/1
TABLET ORAL
COMMUNITY
Start: 2024-07-30 | End: 2024-08-23 | Stop reason: SDUPTHER

## 2024-08-23 RX ORDER — MELOXICAM 15 MG/1
15 TABLET ORAL DAILY
Qty: 30 TABLET | Refills: 0 | Status: SHIPPED | OUTPATIENT
Start: 2024-08-23

## 2024-08-26 DIAGNOSIS — R52 UNCONTROLLED PAIN: ICD-10-CM

## 2024-08-26 DIAGNOSIS — M54.16 LUMBAR RADICULOPATHY: ICD-10-CM

## 2024-08-26 DIAGNOSIS — R60.9 EDEMA, UNSPECIFIED TYPE: ICD-10-CM

## 2024-08-26 DIAGNOSIS — M43.17 ACQUIRED SPONDYLOLISTHESIS OF LUMBOSACRAL REGION: ICD-10-CM

## 2024-08-26 DIAGNOSIS — M47.27 LUMBOSACRAL SPONDYLOSIS WITH RADICULOPATHY: ICD-10-CM

## 2024-08-29 RX ORDER — GABAPENTIN 100 MG/1
100 CAPSULE ORAL 3 TIMES DAILY
Qty: 90 CAPSULE | Refills: 0 | Status: SHIPPED | OUTPATIENT
Start: 2024-08-29

## 2024-08-29 RX ORDER — PREDNISONE 10 MG/1
TABLET ORAL
Qty: 64 TABLET | Refills: 0 | Status: SHIPPED | OUTPATIENT
Start: 2024-08-29

## 2024-08-29 RX ORDER — TORSEMIDE 20 MG/1
40 TABLET ORAL DAILY
Qty: 180 TABLET | Refills: 1 | Status: SHIPPED | OUTPATIENT
Start: 2024-08-29

## 2024-09-13 ENCOUNTER — APPOINTMENT (OUTPATIENT)
Dept: PRIMARY CARE | Facility: CLINIC | Age: 54
End: 2024-09-13
Payer: COMMERCIAL

## 2024-09-16 ENCOUNTER — APPOINTMENT (OUTPATIENT)
Dept: PRIMARY CARE | Facility: CLINIC | Age: 54
End: 2024-09-16
Payer: COMMERCIAL

## 2024-09-19 DIAGNOSIS — M54.16 LUMBAR RADICULOPATHY: ICD-10-CM

## 2024-09-19 DIAGNOSIS — M47.27 LUMBOSACRAL SPONDYLOSIS WITH RADICULOPATHY: ICD-10-CM

## 2024-09-19 DIAGNOSIS — R52 UNCONTROLLED PAIN: ICD-10-CM

## 2024-09-19 DIAGNOSIS — M43.17 ACQUIRED SPONDYLOLISTHESIS OF LUMBOSACRAL REGION: ICD-10-CM

## 2024-09-20 RX ORDER — PREDNISONE 10 MG/1
TABLET ORAL
Qty: 64 TABLET | Refills: 0 | Status: SHIPPED | OUTPATIENT
Start: 2024-09-20

## 2024-09-20 RX ORDER — GABAPENTIN 100 MG/1
CAPSULE ORAL
Qty: 180 CAPSULE | Refills: 1 | Status: SHIPPED | OUTPATIENT
Start: 2024-09-20

## 2024-09-26 ENCOUNTER — TELEPHONE (OUTPATIENT)
Dept: PRIMARY CARE | Facility: CLINIC | Age: 54
End: 2024-09-26
Payer: COMMERCIAL

## 2024-09-26 DIAGNOSIS — R52 UNCONTROLLED PAIN: ICD-10-CM

## 2024-09-26 DIAGNOSIS — R06.02 SOBOE (SHORTNESS OF BREATH ON EXERTION): ICD-10-CM

## 2024-09-27 ENCOUNTER — APPOINTMENT (OUTPATIENT)
Dept: PRIMARY CARE | Facility: CLINIC | Age: 54
End: 2024-09-27
Payer: COMMERCIAL

## 2024-09-27 DIAGNOSIS — Z23 FLU VACCINE NEED: ICD-10-CM

## 2024-09-30 ENCOUNTER — APPOINTMENT (OUTPATIENT)
Dept: PRIMARY CARE | Facility: CLINIC | Age: 54
End: 2024-09-30
Payer: COMMERCIAL

## 2024-09-30 VITALS
WEIGHT: 280 LBS | BODY MASS INDEX: 52.87 KG/M2 | HEART RATE: 96 BPM | DIASTOLIC BLOOD PRESSURE: 80 MMHG | SYSTOLIC BLOOD PRESSURE: 168 MMHG | OXYGEN SATURATION: 97 % | HEIGHT: 61 IN

## 2024-09-30 DIAGNOSIS — M47.27 LUMBOSACRAL SPONDYLOSIS WITH RADICULOPATHY: ICD-10-CM

## 2024-09-30 DIAGNOSIS — G47.00 INSOMNIA, UNSPECIFIED TYPE: ICD-10-CM

## 2024-09-30 DIAGNOSIS — K21.9 GASTROESOPHAGEAL REFLUX DISEASE, UNSPECIFIED WHETHER ESOPHAGITIS PRESENT: ICD-10-CM

## 2024-09-30 DIAGNOSIS — Z12.11 ENCOUNTER FOR SCREENING COLONOSCOPY: ICD-10-CM

## 2024-09-30 DIAGNOSIS — R60.9 EDEMA, UNSPECIFIED TYPE: ICD-10-CM

## 2024-09-30 DIAGNOSIS — M54.16 LUMBAR RADICULOPATHY: ICD-10-CM

## 2024-09-30 DIAGNOSIS — I10 PRIMARY HYPERTENSION: ICD-10-CM

## 2024-09-30 DIAGNOSIS — M43.17 ACQUIRED SPONDYLOLISTHESIS OF LUMBOSACRAL REGION: ICD-10-CM

## 2024-09-30 DIAGNOSIS — Z23 FLU VACCINE NEED: ICD-10-CM

## 2024-09-30 DIAGNOSIS — M54.50 LOW BACK PAIN WITHOUT SCIATICA, UNSPECIFIED BACK PAIN LATERALITY, UNSPECIFIED CHRONICITY: Primary | ICD-10-CM

## 2024-09-30 PROCEDURE — 3008F BODY MASS INDEX DOCD: CPT | Performed by: EMERGENCY MEDICINE

## 2024-09-30 PROCEDURE — 3077F SYST BP >= 140 MM HG: CPT | Performed by: EMERGENCY MEDICINE

## 2024-09-30 PROCEDURE — 99214 OFFICE O/P EST MOD 30 MIN: CPT | Performed by: EMERGENCY MEDICINE

## 2024-09-30 PROCEDURE — 1036F TOBACCO NON-USER: CPT | Performed by: EMERGENCY MEDICINE

## 2024-09-30 PROCEDURE — 3079F DIAST BP 80-89 MM HG: CPT | Performed by: EMERGENCY MEDICINE

## 2024-09-30 PROCEDURE — 99396 PREV VISIT EST AGE 40-64: CPT | Performed by: EMERGENCY MEDICINE

## 2024-09-30 PROCEDURE — G0444 DEPRESSION SCREEN ANNUAL: HCPCS | Performed by: EMERGENCY MEDICINE

## 2024-09-30 PROCEDURE — G0442 ANNUAL ALCOHOL SCREEN 15 MIN: HCPCS | Performed by: EMERGENCY MEDICINE

## 2024-09-30 RX ORDER — TRAZODONE HYDROCHLORIDE 100 MG/1
200 TABLET ORAL NIGHTLY PRN
Qty: 180 TABLET | Refills: 1 | Status: SHIPPED | OUTPATIENT
Start: 2024-09-30 | End: 2025-09-30

## 2024-09-30 RX ORDER — LOSARTAN POTASSIUM 50 MG/1
50 TABLET ORAL DAILY
Qty: 30 TABLET | Refills: 2 | Status: SHIPPED | OUTPATIENT
Start: 2024-09-30 | End: 2025-09-30

## 2024-09-30 RX ORDER — GABAPENTIN 100 MG/1
100 CAPSULE ORAL 3 TIMES DAILY
Qty: 90 CAPSULE | OUTPATIENT
Start: 2024-09-30

## 2024-09-30 NOTE — PROGRESS NOTES
Subjective   Patient ID: Li Allison is a 54 y.o. female who presents for back pain.    HPI  Pt presents for physical and follow up    Patient presents with ongoing back pain for over a year.    Patient has a history of peripheral edema, back pain, and anxiety    PRIOR HISTORY -    Pt was  admitted to [facility]  on 6/13/2024 for increased shortness of breath leg swelling and weight gain concerning for congestive heart failure with elevated D-dimer 1394 and acute UTI.  Patient had bilateral DVT scan which was negative.  Patient declined CT PE treated with IV Cipro for UTI.  Cardiology and hematology oncology was consulted.  Patient was recommended to have IV Lasix and CT PE which she declined. ECHO with moderate left ventricular hypertrophy. Patient was doing much better on 6/14/2024 was discharged home with p.o. Lasix 40 mg daily advised to stop meloxicam and prednisone for back pain and improved swelling.  Advised to follow-up with PCP and cardiology.    Pt noticing  back and hip pain on R side. Pt has discontinued Meloxicam and is looking for a different medication for pain. Pt has tried Tylenol arthritis, Tylenol,  and states relief was not as good as Meloxicam. Describes as a nerve pain down back of R hip and goes down front of R thigh to R knee. Currently rates severity of pain 10/10. States she wants to be more active but cannot because of the pain limiting her physical activity. On chronic oral prednisone for more than 6-month.  Want to go off of it due to increased swelling and weight gain. Hesitant to try past pain medications again due to past fluid overload secondary to Meloxicam and steroid use.    Pt also endorses issues with sleeping. Pt taking hydroxyzine, keeps pt asleep for 1-2 hours. Pt awakes after 1-2 hours and walks around. Pt would like a different medication to help her sleep. Continue to c/o increased Anxiety  and thought raising.         Social History     Tobacco Use    Smoking status:  Never     Passive exposure: Never    Smokeless tobacco: Never   Vaping Use    Vaping status: Never Used   Substance Use Topics    Alcohol use: Not Currently    Drug use: Never       Allergies   Allergen Reactions    Methylprednisolone GI Upset    Penicillins Unknown and Rash       Current Outpatient Medications   Medication Sig Dispense Refill    albuterol (ProAir HFA) 90 mcg/actuation inhaler Inhale 2 puffs every 4 hours if needed for wheezing or shortness of breath. 8.5 g 5    clobetasol (Temovate) 0.05 % cream Apply topically 2 times a day.      DULoxetine (Cymbalta) 30 mg DR capsule TAKE 1 CAPSULE (30 MG) BY MOUTH ONCE DAILY. DO NOT CRUSH OR CHEW. 90 capsule 1    gabapentin (Neurontin) 100 mg capsule TAKE 2 CAPSULES (200 MG) BY MOUTH 3 TIMES A DAY. 180 capsule 1    meloxicam (Mobic) 15 mg tablet TAKE 1 TABLET BY MOUTH EVERY DAY 30 tablet 0    predniSONE (Deltasone) 10 mg tablet 6 TABS X 2 DAYS, 5 TABS X 2 DAYS, 4 TABS X 2 DAYS, 3 TABS X 2 DAYS, 2 TABS X 7 DAYS, 1 TAB X 2 WEEKS 64 tablet 0    spironolactone (Aldactone) 50 mg tablet TAKE 2 TABLETS (100 MG) BY MOUTH ONCE DAILY. ONCE THE SWELLING GOES DOWN, CUT THE DOSE TO HALF 60 tablet 1    torsemide (Demadex) 20 mg tablet TAKE 2 TABLETS (40 MG) BY MOUTH ONCE DAILY. ONCE THE SWELLING IS DOWN, CUT THE DOSE TO HALF 180 tablet 1    traZODone (Desyrel) 100 mg tablet Take 1 tablet (100 mg) by mouth as needed at bedtime for sleep. (Patient taking differently: Take 2 tablets (200 mg) by mouth as needed at bedtime for sleep.) 90 tablet 1    cyclobenzaprine (Flexeril) 10 mg tablet TAKE 1 TABLET (10 MG) BY MOUTH 3 TIMES A DAY AS NEEDED FOR MUSCLE SPASMS FOR UP TO 10 DAYS. (Patient not taking: Reported on 9/30/2024) 30 tablet 0    fluticasone (Flonase) 50 mcg/actuation nasal spray Administer 2 sprays into each nostril once daily.      furosemide (Lasix) 40 mg tablet Take 1 tablet (40 mg) by mouth once daily for 7 days. 7 tablet 0    hydrOXYzine HCL (Atarax) 25 mg tablet Take  1 tablet (25 mg) by mouth once daily at bedtime.      Lasix 40 mg tablet Take 1 tablet (40 mg) by mouth once daily for 7 days. 7 tablet 0     No current facility-administered medications for this visit.       Physical Exam    General Appearance:  Alert, cooperative, no distress,   Head:  Normocephalic, atraumatic   Eyes:  PERRL, conjunctiva/corneas clear, EOM's intact,    Lungs:   Clear to auscultation bilaterally, respirations unlabored   Heart:  Regular rate and rhythm, S1 and S2 normal, no murmur,    Neurologic: Normal      No visits with results within 3 Month(s) from this visit.   Latest known visit with results is:   No results found for any previous visit.       Assessment/Plan   Patient presents for follow up and physical    Hypertension - Patient states that BP is consistently high, We will prescribe losartan and monitor.    Back and hip pain - Patient states that gabapentin is no longer benefiting her, she is on prednisone and meloxicam daily, along with flexeril as necessary. Pain focused in right hip and lower back. We will provide a referral for orthopedic surgeon as per patient request. Order for x-ray provided, patient refused MRI.    Peripheral edema - Patient takes torsemide and aldactone as necessary, counseled to reduce fluid intake.    GERD - Patient complains of consistent heartburn. Recommended OTC prilosec and metamucil daily.    Anxiety - Patient is on 60 mg Cymbalta.    Preventative care -  Order for hilario provided    PH Q-9 depression screening was completed by authorized employee of the practice for 5-10 minutes and  explained the questionnaire and discussed the answers with the patient.     Alcohol screening was completed for 5 to 10 minutes    Follow up in 3 months or as necessary

## 2024-10-01 RX ORDER — PREDNISONE 10 MG/1
TABLET ORAL
Qty: 64 TABLET | Refills: 0 | Status: SHIPPED | OUTPATIENT
Start: 2024-10-01

## 2024-10-03 NOTE — TELEPHONE ENCOUNTER
Pt calling regarding albuterol inhaler she needs it sent in with refills, she is using about one inhaler a month

## 2024-10-04 RX ORDER — ALBUTEROL SULFATE 90 UG/1
2 INHALANT RESPIRATORY (INHALATION) EVERY 4 HOURS PRN
Qty: 18 G | Refills: 5 | Status: SHIPPED | OUTPATIENT
Start: 2024-10-04

## 2024-10-07 ENCOUNTER — TELEPHONE (OUTPATIENT)
Dept: PRIMARY CARE | Facility: CLINIC | Age: 54
End: 2024-10-07
Payer: COMMERCIAL

## 2024-10-15 ENCOUNTER — TELEPHONE (OUTPATIENT)
Dept: PRIMARY CARE | Facility: CLINIC | Age: 54
End: 2024-10-15
Payer: COMMERCIAL

## 2024-10-15 NOTE — TELEPHONE ENCOUNTER
Dr Solis stated for PT to not do xray's from Dr Rader that he will order what is needed.      PT is asking if she can up her meloxicam (Mobic) 15 mg tablet to two pills a day?  PT would then need a new refill.

## 2024-10-17 LAB
NON-UH HIE A/G RATIO: 1
NON-UH HIE ABSOLUTE BAND CT: 0 X1000 (ref 0–0.7)
NON-UH HIE ABSOLUTE LYMPH CT: 0.46 X1000 (ref 1.2–4.8)
NON-UH HIE ABSOLUTE MONO CT: 0.31 X1000 (ref 0.1–1)
NON-UH HIE ABSOLUTE NEUTROPHIL CT: 14.23 X1000 (ref 1.4–8.8)
NON-UH HIE ABSOLUTE SEG CT: 14.23 X1000 (ref 1.4–8.8)
NON-UH HIE ALB: 3.3 G/DL (ref 3.4–5)
NON-UH HIE ALK PHOS: 78 UNIT/L (ref 45–117)
NON-UH HIE ANISOCYTOSIS: ABNORMAL
NON-UH HIE APPEARANCE, U: CLEAR
NON-UH HIE B-TYPE NATRIURETIC PEPTIDE: 40 PG/ML (ref 0–100)
NON-UH HIE BAND %: 0 %
NON-UH HIE BILIRUBIN, TOTAL: 0.3 MG/DL (ref 0.3–1.2)
NON-UH HIE BILIRUBIN, U: NEGATIVE
NON-UH HIE BLOOD, U: NEGATIVE
NON-UH HIE BUN/CREAT RATIO: 28.8
NON-UH HIE BUN: 23 MG/DL (ref 9–23)
NON-UH HIE CALCIUM: 9.7 MG/DL (ref 8.7–10.4)
NON-UH HIE CALCULATED LDL CHOLESTEROL: 103 MG/DL (ref 60–130)
NON-UH HIE CALCULATED OSMOLALITY: 285 MOSM/KG (ref 275–295)
NON-UH HIE CHLORIDE: 105 MMOL/L (ref 98–107)
NON-UH HIE CHOLESTEROL: 222 MG/DL (ref 100–200)
NON-UH HIE CO2, VENOUS: 25 MMOL/L (ref 20–31)
NON-UH HIE COLOR, U: NORMAL
NON-UH HIE CREATININE: 0.8 MG/DL (ref 0.5–0.8)
NON-UH HIE D DIMER HS: 814 NG/ML FEU
NON-UH HIE DIFF?: YES
NON-UH HIE DXH ACTIONS: ABNORMAL
NON-UH HIE GFR AA: >60
NON-UH HIE GLOBULIN: 3.3 G/DL
NON-UH HIE GLOMERULAR FILTRATION RATE: >60 ML/MIN/1.73M?
NON-UH HIE GLUCOSE QUAL, U: NEGATIVE
NON-UH HIE GLUCOSE: 132 MG/DL (ref 74–106)
NON-UH HIE GOT: 11 UNIT/L (ref 15–37)
NON-UH HIE GPT: 24 UNIT/L (ref 10–49)
NON-UH HIE HCT: 31.6 % (ref 36–46)
NON-UH HIE HDL CHOLESTEROL: 91 MG/DL (ref 40–60)
NON-UH HIE HEM PATH REVIEW: ABNORMAL
NON-UH HIE HGB: 9.8 G/DL (ref 12–16)
NON-UH HIE HYPOCHROMASIA: ABNORMAL
NON-UH HIE INSTR WBC: 15.3
NON-UH HIE K: 4.6 MMOL/L (ref 3.5–5.1)
NON-UH HIE KETONES, U: NEGATIVE
NON-UH HIE LEFT SHIFT: PRESENT
NON-UH HIE LEUKOCYTE ESTERASE, U: NEGATIVE
NON-UH HIE LYMPHOCYTE %: 3 %
NON-UH HIE MCH: 21.3 PG (ref 27–34)
NON-UH HIE MCHC: 30.9 G/DL (ref 32–37)
NON-UH HIE MCV: 69 FL (ref 80–100)
NON-UH HIE METAMYELOCYTE %: 1 %
NON-UH HIE MICROCYCTOSIS: ABNORMAL
NON-UH HIE MONOCYTE %: 2 %
NON-UH HIE MPV: 7.2 FL (ref 7.4–10.4)
NON-UH HIE MYELOCYTE %: 1 %
NON-UH HIE NA: 140 MMOL/L (ref 135–145)
NON-UH HIE NITRITE, U: NEGATIVE
NON-UH HIE NUCLEATED RBC: 0 /100WBC
NON-UH HIE OVALOCYTES: SLIGHT
NON-UH HIE PH, U: 6.5 (ref 4.5–8)
NON-UH HIE PLATELET: 640 X10 (ref 150–450)
NON-UH HIE POIKILOCYTOSIS: SLIGHT
NON-UH HIE POLYCHROMASIA: SLIGHT
NON-UH HIE PROTEIN, U: NEGATIVE
NON-UH HIE RBC: 4.58 X10 (ref 4.2–5.4)
NON-UH HIE RDW: 20.7 % (ref 11.5–14.5)
NON-UH HIE SEGMENTED NEUT %: 93 %
NON-UH HIE SPECIFIC GRAVITY, U: 1.01 (ref 1–1.03)
NON-UH HIE TOTAL CHOL/HDL CHOL RATIO: 2.4
NON-UH HIE TOTAL PROTEIN: 6.6 G/DL (ref 5.7–8.2)
NON-UH HIE TRIGLYCERIDES: 139 MG/DL (ref 30–150)
NON-UH HIE U MICRO: NORMAL
NON-UH HIE UROBILINOGEN QUAL, U: NORMAL
NON-UH HIE VITAMIN B12: 469 PG/ML (ref 211–911)
NON-UH HIE WBC: 15.3 X10 (ref 4.5–11)

## 2024-10-18 LAB — NON-UH HIE DIFF REVIEW INTERP: NORMAL

## 2024-10-19 DIAGNOSIS — M43.17 ACQUIRED SPONDYLOLISTHESIS OF LUMBOSACRAL REGION: ICD-10-CM

## 2024-10-19 DIAGNOSIS — M54.50 CHRONIC LOW BACK PAIN, UNSPECIFIED BACK PAIN LATERALITY, UNSPECIFIED WHETHER SCIATICA PRESENT: ICD-10-CM

## 2024-10-19 DIAGNOSIS — M47.27 LUMBOSACRAL SPONDYLOSIS WITH RADICULOPATHY: ICD-10-CM

## 2024-10-19 DIAGNOSIS — M54.16 LUMBAR RADICULOPATHY: ICD-10-CM

## 2024-10-19 DIAGNOSIS — G89.29 CHRONIC LOW BACK PAIN, UNSPECIFIED BACK PAIN LATERALITY, UNSPECIFIED WHETHER SCIATICA PRESENT: ICD-10-CM

## 2024-10-21 RX ORDER — PREDNISONE 10 MG/1
TABLET ORAL
Qty: 64 TABLET | Refills: 0 | Status: SHIPPED | OUTPATIENT
Start: 2024-10-21

## 2024-10-21 RX ORDER — CYCLOBENZAPRINE HCL 10 MG
10 TABLET ORAL 3 TIMES DAILY PRN
Qty: 30 TABLET | Refills: 0 | Status: SHIPPED | OUTPATIENT
Start: 2024-10-21 | End: 2024-10-26 | Stop reason: SDUPTHER

## 2024-10-26 DIAGNOSIS — G89.29 CHRONIC LOW BACK PAIN, UNSPECIFIED BACK PAIN LATERALITY, UNSPECIFIED WHETHER SCIATICA PRESENT: ICD-10-CM

## 2024-10-26 DIAGNOSIS — R52 UNCONTROLLED PAIN: ICD-10-CM

## 2024-10-26 DIAGNOSIS — M47.27 LUMBOSACRAL SPONDYLOSIS WITH RADICULOPATHY: ICD-10-CM

## 2024-10-26 DIAGNOSIS — M43.17 ACQUIRED SPONDYLOLISTHESIS OF LUMBOSACRAL REGION: ICD-10-CM

## 2024-10-26 DIAGNOSIS — M54.16 LUMBAR RADICULOPATHY: ICD-10-CM

## 2024-10-26 DIAGNOSIS — M54.50 CHRONIC LOW BACK PAIN, UNSPECIFIED BACK PAIN LATERALITY, UNSPECIFIED WHETHER SCIATICA PRESENT: ICD-10-CM

## 2024-10-26 RX ORDER — CYCLOBENZAPRINE HCL 10 MG
10 TABLET ORAL 3 TIMES DAILY PRN
Qty: 30 TABLET | Refills: 0 | Status: SHIPPED | OUTPATIENT
Start: 2024-10-26 | End: 2024-11-05

## 2024-10-29 RX ORDER — GABAPENTIN 100 MG/1
100 CAPSULE ORAL 3 TIMES DAILY
Qty: 90 CAPSULE | Refills: 0 | Status: SHIPPED | OUTPATIENT
Start: 2024-10-29

## 2024-10-29 RX ORDER — PREDNISONE 10 MG/1
TABLET ORAL
Qty: 64 TABLET | Refills: 0 | Status: SHIPPED | OUTPATIENT
Start: 2024-10-29

## 2024-11-03 DIAGNOSIS — M47.27 LUMBOSACRAL SPONDYLOSIS WITH RADICULOPATHY: ICD-10-CM

## 2024-11-03 DIAGNOSIS — R52 UNCONTROLLED PAIN: ICD-10-CM

## 2024-11-03 DIAGNOSIS — M43.17 ACQUIRED SPONDYLOLISTHESIS OF LUMBOSACRAL REGION: ICD-10-CM

## 2024-11-03 DIAGNOSIS — M54.16 LUMBAR RADICULOPATHY: ICD-10-CM

## 2024-11-04 RX ORDER — GABAPENTIN 100 MG/1
CAPSULE ORAL
Qty: 180 CAPSULE | Refills: 1 | Status: SHIPPED | OUTPATIENT
Start: 2024-11-04

## 2024-11-05 RX ORDER — PREDNISONE 10 MG/1
TABLET ORAL
Qty: 64 TABLET | Refills: 0 | OUTPATIENT
Start: 2024-11-05

## 2024-11-10 DIAGNOSIS — M47.27 LUMBOSACRAL SPONDYLOSIS WITH RADICULOPATHY: ICD-10-CM

## 2024-11-10 DIAGNOSIS — M43.17 ACQUIRED SPONDYLOLISTHESIS OF LUMBOSACRAL REGION: ICD-10-CM

## 2024-11-10 DIAGNOSIS — M54.16 LUMBAR RADICULOPATHY: ICD-10-CM

## 2024-11-11 RX ORDER — PREDNISONE 10 MG/1
TABLET ORAL
Qty: 64 TABLET | Refills: 0 | Status: SHIPPED | OUTPATIENT
Start: 2024-11-11

## 2024-11-20 ENCOUNTER — TELEPHONE (OUTPATIENT)
Dept: PRIMARY CARE | Facility: CLINIC | Age: 54
End: 2024-11-20
Payer: COMMERCIAL

## 2024-11-20 DIAGNOSIS — Z12.31 VISIT FOR SCREENING MAMMOGRAM: ICD-10-CM

## 2024-11-28 DIAGNOSIS — I10 PRIMARY HYPERTENSION: ICD-10-CM

## 2024-11-28 DIAGNOSIS — M54.16 LUMBAR RADICULOPATHY: ICD-10-CM

## 2024-11-28 DIAGNOSIS — R52 UNCONTROLLED PAIN: ICD-10-CM

## 2024-11-28 DIAGNOSIS — M43.17 ACQUIRED SPONDYLOLISTHESIS OF LUMBOSACRAL REGION: ICD-10-CM

## 2024-11-28 DIAGNOSIS — M47.27 LUMBOSACRAL SPONDYLOSIS WITH RADICULOPATHY: ICD-10-CM

## 2024-12-02 RX ORDER — PREDNISONE 10 MG/1
TABLET ORAL
Qty: 64 TABLET | Refills: 0 | Status: SHIPPED | OUTPATIENT
Start: 2024-12-02

## 2024-12-02 RX ORDER — GABAPENTIN 100 MG/1
100 CAPSULE ORAL 3 TIMES DAILY
Qty: 90 CAPSULE | Refills: 2 | Status: SHIPPED | OUTPATIENT
Start: 2024-12-02

## 2024-12-02 RX ORDER — LOSARTAN POTASSIUM 50 MG/1
50 TABLET ORAL DAILY
Qty: 90 TABLET | Refills: 1 | Status: SHIPPED | OUTPATIENT
Start: 2024-12-02

## 2024-12-03 DIAGNOSIS — R60.9 EDEMA, UNSPECIFIED TYPE: ICD-10-CM

## 2024-12-04 RX ORDER — TORSEMIDE 20 MG/1
40 TABLET ORAL DAILY
Qty: 60 TABLET | Refills: 2 | Status: SHIPPED | OUTPATIENT
Start: 2024-12-04

## 2024-12-18 DIAGNOSIS — M54.16 LUMBAR RADICULOPATHY: ICD-10-CM

## 2024-12-19 ENCOUNTER — TELEPHONE (OUTPATIENT)
Dept: PRIMARY CARE | Facility: CLINIC | Age: 54
End: 2024-12-19
Payer: COMMERCIAL

## 2024-12-19 RX ORDER — MELOXICAM 15 MG/1
15 TABLET ORAL DAILY
Qty: 30 TABLET | Refills: 0 | OUTPATIENT
Start: 2024-12-19

## 2024-12-19 NOTE — TELEPHONE ENCOUNTER
Pt is calling regarding a [facility] appt that you wanted her to schedule. Says that she is suppose to have an appt Monday 23rd at 9 with Dr. Nair but I dont see that this was scheduled. Is this the date and time you wanted her to come in?

## 2024-12-19 NOTE — TELEPHONE ENCOUNTER
TCM, discharged on 12/18/24 , patient did receive their discharge medications. Patient does not require immediate attention of the attending at this time and is scheduled for an appointment on 12/23/24

## 2024-12-23 ENCOUNTER — APPOINTMENT (OUTPATIENT)
Dept: PRIMARY CARE | Facility: CLINIC | Age: 54
End: 2024-12-23
Payer: COMMERCIAL

## 2024-12-23 DIAGNOSIS — M48.061 SPINAL STENOSIS OF LUMBAR REGION, UNSPECIFIED WHETHER NEUROGENIC CLAUDICATION PRESENT: Primary | ICD-10-CM

## 2024-12-23 DIAGNOSIS — M54.16 LUMBAR RADICULOPATHY: ICD-10-CM

## 2024-12-23 DIAGNOSIS — M47.27 LUMBOSACRAL SPONDYLOSIS WITH RADICULOPATHY: ICD-10-CM

## 2024-12-23 DIAGNOSIS — M43.17 ACQUIRED SPONDYLOLISTHESIS OF LUMBOSACRAL REGION: ICD-10-CM

## 2024-12-23 PROCEDURE — 99214 OFFICE O/P EST MOD 30 MIN: CPT | Performed by: INTERNAL MEDICINE

## 2024-12-23 RX ORDER — PREDNISONE 10 MG/1
TABLET ORAL
Qty: 64 TABLET | Refills: 0 | Status: SHIPPED | OUTPATIENT
Start: 2024-12-23

## 2024-12-23 RX ORDER — HYDROCODONE BITARTRATE AND ACETAMINOPHEN 5; 325 MG/1; MG/1
1 TABLET ORAL EVERY 6 HOURS PRN
Qty: 56 TABLET | Refills: 0 | Status: SHIPPED | OUTPATIENT
Start: 2024-12-23 | End: 2025-01-06

## 2024-12-23 RX ORDER — HYDROCODONE BITARTRATE AND ACETAMINOPHEN 5; 325 MG/1; MG/1
1 TABLET ORAL EVERY 6 HOURS PRN
Qty: 56 TABLET | Refills: 0 | Status: SHIPPED | OUTPATIENT
Start: 2024-12-23 | End: 2024-12-23

## 2024-12-23 RX ORDER — MELOXICAM 15 MG/1
15 TABLET ORAL DAILY
Qty: 90 TABLET | Refills: 3 | Status: SHIPPED | OUTPATIENT
Start: 2024-12-23

## 2024-12-23 RX ORDER — HYDROCODONE BITARTRATE AND ACETAMINOPHEN 5; 325 MG/1; MG/1
1 TABLET ORAL EVERY 6 HOURS PRN
Qty: 56 TABLET | Refills: 0 | Status: SHIPPED | OUTPATIENT
Start: 2024-12-23 | End: 2024-12-23 | Stop reason: SDUPTHER

## 2024-12-23 ASSESSMENT — ENCOUNTER SYMPTOMS
PARESTHESIAS: 1
WEAKNESS: 1
BACK PAIN: 1
LEG PAIN: 1
TINGLING: 1
PARESIS: 1
HEADACHES: 1
NUMBNESS: 1

## 2024-12-23 NOTE — PROGRESS NOTES
"Subjective   Patient ID: Li Allison is a 54 y.o. female who presents for the following    Virtual CONVERTED To Telephone Consent ( I did see patient's face to confirm )    An interactive audio and video telecommunication system which permits real time communications between the patient (at the originating site) and provider (at the distant site) was utilized to provide this telehealth service.   Verbal consent was requested and obtained from Li Allison on this date, 12/23/24 for a telehealth visit.       Assessment/Plan   LUMBAR STENOSIS:   Refer to pain management , dr corral  Will sent patient hydrocodone #56  Urine drug screen  Needs pain management     HPI    Patient was hospitalized at [facility] on December 18, 2024.  Patient was having difficulty ambulating for about 5 days.  Patient was getting \"terrible low back pain\".  She has had tried Advil Motrin prednisone meloxicam for years now.  She is concerned that she has spinal stenosis per her orthopedist.  The orthopedist sent her to the hospital.  Patient was found to have left sacroiliitis.  CT lumbar scan shows mild to moderate DJD.  Patient was discharged on hydrocodone.    Visit Vitals  Smoking Status Never     PHYSICAL EXAM       REVIEW OF SYSTEMS       Allergies   Allergen Reactions    Methylprednisolone GI Upset    Penicillins Unknown and Rash       Current Outpatient Medications   Medication Sig Dispense Refill    albuterol (ProAir HFA) 90 mcg/actuation inhaler Inhale 2 puffs every 4 hours if needed for wheezing or shortness of breath. 18 g 5    clobetasol (Temovate) 0.05 % cream Apply topically 2 times a day.      cyclobenzaprine (Flexeril) 10 mg tablet TAKE 1 TABLET (10 MG) BY MOUTH 3 TIMES A DAY AS NEEDED FOR MUSCLE SPASMS FOR UP TO 10 DAYS. 30 tablet 0    DULoxetine (Cymbalta) 30 mg DR capsule TAKE 1 CAPSULE (30 MG) BY MOUTH ONCE DAILY. DO NOT CRUSH OR CHEW. (Patient taking differently: Take 2 capsules (60 mg) by mouth once " daily. Do not crush or chew.) 90 capsule 1    fluticasone (Flonase) 50 mcg/actuation nasal spray Administer 2 sprays into each nostril once daily.      furosemide (Lasix) 40 mg tablet Take 1 tablet (40 mg) by mouth once daily for 7 days. 7 tablet 0    gabapentin (Neurontin) 100 mg capsule TAKE 1 CAPSULE BY MOUTH THREE TIMES A DAY 90 capsule 2    hydrOXYzine HCL (Atarax) 25 mg tablet Take 1 tablet (25 mg) by mouth once daily at bedtime.      Lasix 40 mg tablet Take 1 tablet (40 mg) by mouth once daily for 7 days. 7 tablet 0    losartan (Cozaar) 50 mg tablet TAKE 1 TABLET BY MOUTH EVERY DAY 90 tablet 1    meloxicam (Mobic) 15 mg tablet TAKE 1 TABLET BY MOUTH EVERY DAY 30 tablet 0    predniSONE (Deltasone) 10 mg tablet 6 TABS DAILY X 2 DAYS, 5 DAILY X 2 DAYS, 4 DAILY X 2 , 3 DAILY X 2 , 2 DAILY X 7 , 1 DAILY X 2 WEEKS 64 tablet 0    spironolactone (Aldactone) 50 mg tablet TAKE 2 TABLETS (100 MG) BY MOUTH ONCE DAILY. ONCE THE SWELLING GOES DOWN, CUT THE DOSE TO HALF (Patient taking differently: Take 2 tablets (100 mg) by mouth once daily as needed. Once the swelling goes down, cut the dose to half) 60 tablet 1    torsemide (Demadex) 20 mg tablet TAKE 2 TABLETS (40 MG) BY MOUTH ONCE DAILY. ONCE THE SWELLING IS DOWN, CUT THE DOSE TO HALF 60 tablet 2    traZODone (Desyrel) 100 mg tablet Take 2 tablets (200 mg) by mouth as needed at bedtime for sleep. 180 tablet 1     No current facility-administered medications for this visit.       Objective     Orders Only on 10/18/2024   Component Date Value Ref Range Status    [facility] Diff Review Interp 10/18/2024    Final                    Value:Granulocytic leukocytosis with no increase of basophils or eosinophils, compatible with infectious or reactive process.  Anemia with anisocytosis, microcytes, mild hypochromia and occasional target cells.  Clinical correlation is indicated for etiology.    Occasional giant platelets are noted.     Orders Only on 10/17/2024   Component Date  Value Ref Range Status    [facility] Appearance, U 10/17/2024 Clear  Clear Final    [facility] Protein, U 10/17/2024 Negative  Negative Final    [facility] Blood, U 10/17/2024 Negative  Negative Final    [facility] Leukocyte Esterase, U 10/17/2024 Negative  Negative Final    [facility] Ketones, U 10/17/2024 Negative  Negative Final    [facility] Color, U 10/17/2024 Light-Yellow  Yellow Final    [facility] Urobilinogen Qual, U 10/17/2024 < 2 mg/dl  < 2 mg/dl Final    [facility] Glucose Qual, U 10/17/2024 Negative  Negative Final    [facility] pH, U 10/17/2024 6.5  4.5 - 8.0 Final    [facility] U MICRO 10/17/2024 Not Indicated   Final    [facility] Specific Gravity, U 10/17/2024 1.011  1.001 - 1.035 Final    [facility] Bilirubin, U 10/17/2024 Negative  Negative Final    [facility] Nitrite, U 10/17/2024 Negative  Negative Final    [facility] D Dimer HS 10/17/2024 814 (H)  <=499 ng/mL FEU Final    [facility] B-type Natriuretic Pept* 10/17/2024 40  0 - 100 pg/mL Final    [facility] DIFF? 10/17/2024 Yes   Final    [facility] Instr WBC 10/17/2024 15.3   Final    [facility] RBC 10/17/2024 4.58  4.20 - 5.40 x10 Final    [facility] HCT 10/17/2024 31.6 (L)  36.0 - 46.0 % Final    [facility] Platelet 10/17/2024 640 (H)  150 - 450 x10 Final    [facility] DxH Actions 10/17/2024 See Notes (A)   Final    [facility] WBC 10/17/2024 15.3 (H)  4.5 - 11.0 x10 Final    [facility] MCHC 10/17/2024 30.9 (L)  32.0 - 37.0 g/dL Final    [facility] HGB 10/17/2024 9.8 (L)  12.0 - 16.0 g/dL Final    [facility] MCV 10/17/2024 69.0 (L)  80.0 - 100.0 fL Final    [facility] MPV 10/17/2024 7.2 (L)  7.4 - 10.4 fL Final    [facility] RDW 10/17/2024 20.7 (H)  11.5 - 14.5 % Final    [facility] MCH 10/17/2024 21.3 (L)  27.0 - 34.0 pg Final    [facility] Nucleated RBC 10/17/2024 0  /100WBC Final    [facility] HEM PATH REVIEW 10/17/2024 See Diff Review Interp   Final    [facility] Hypochromasia 10/17/2024 Mod/Mkd   Final    [facility] Absolute Mono Ct  10/17/2024 0.31  0.10 - 1.00 x1000 Final    [facility] Monocyte % 10/17/2024 2  % Final    [facility] Absolute Neutrophil Ct 10/17/2024 14.23 (H)  1.40 - 8.80 x1000 Final    [facility] Poikilocytosis 10/17/2024 Slight   Final    [facility] Band % 10/17/2024 0  % Final    [facility] Absolute Seg Ct 10/17/2024 14.23 (H)  1.40 - 8.80 x1000 Final    [facility] Left Shift 10/17/2024 Present (A)   Final    [facility] Microcyctosis 10/17/2024 Mod/Mkd   Final    [facility] Metamyelocyte % 10/17/2024 1  % Final    [facility] Polychromasia 10/17/2024 Slight   Final    [facility] Absolute Lymph Ct 10/17/2024 0.46 (L)  1.20 - 4.80 x1000 Final    [facility] Lymphocyte % 10/17/2024 3  % Final    [facility] Absolute Band Ct 10/17/2024 0.00  0.00 - 0.70 x1000 Final    [facility] Segmented Neut % 10/17/2024 93  % Final    [facility] Anisocytosis 10/17/2024 Mod/Mkd   Final    [facility] Ovalocytes 10/17/2024 Slight   Final    [facility] Myelocyte % 10/17/2024 1  % Final    [facility] Vitamin B12 10/17/2024 469  211 - 911 pg/mL Final    [facility] Glucose 10/17/2024 132 (H)  74 - 106 mg/dL Final    [facility] Alk Phos 10/17/2024 78  45 - 117 unit/L Final    [facility] GFR AA 10/17/2024 >60   Final    [facility] Chloride 10/17/2024 105  98 - 107 mmol/L Final    [facility] Bilirubin, Total 10/17/2024 0.30  0.30 - 1.20 mg/dL Final    [facility] ALB 10/17/2024 3.3 (L)  3.4 - 5.0 g/dL Final    [facility] BUN/Creat Ratio 10/17/2024 28.8   Final    [facility] Na 10/17/2024 140  135 - 145 mmol/L Final    [facility] A/G Ratio 10/17/2024 1.0   Final    [facility] GPT 10/17/2024 24  10 - 49 unit/L Final    [facility] Creatinine 10/17/2024 0.8  0.5 - 0.8 mg/dL Final    [facility] CO2, venous 10/17/2024 25.0  20.0 - 31.0 mmol/L Final    [facility] Total Protein 10/17/2024 6.6  5.7 - 8.2 g/dL Final    [facility] K 10/17/2024 4.6  3.5 - 5.1 mmol/L Final    [facility] Calculated Osmolality 10/17/2024 285  275 - 295 mOsm/kg Final    [facility]  Glomerular Filtration R* 10/17/2024 >60  mL/min/1.73m? Final    [facility] Calcium 10/17/2024 9.7  8.7 - 10.4 mg/dL Final    [facility] Globulin 10/17/2024 3.3  g/dL Final    [facility] BUN 10/17/2024 23  9 - 23 mg/dL Final    [facility] GOT 10/17/2024 11 (L)  15 - 37 unit/L Final    [facility] Cholesterol 10/17/2024 222 (H)  100 - 200 mg/dL Final    [facility] Calculated LDL Choleste* 10/17/2024 103  60 - 130 mg/dL Final    [facility] HDL Cholesterol 10/17/2024 91 (H)  40 - 60 mg/dL Final    [facility] Total Chol/HDL Chol Rat* 10/17/2024 2.4   Final    [facility] Triglycerides 10/17/2024 139  30 - 150 mg/dL Final       Radiology: Reviewed imaging in powerchart.  No results found.    Family History   Problem Relation Name Age of Onset    Breast cancer Mother      Lung cancer Father      Lung cancer Maternal Grandmother      Lung cancer Maternal Grandfather      Lung cancer Paternal Grandmother      Lung cancer Paternal Grandfather       Social History     Socioeconomic History    Marital status:    Tobacco Use    Smoking status: Never     Passive exposure: Never    Smokeless tobacco: Never   Vaping Use    Vaping status: Never Used   Substance and Sexual Activity    Alcohol use: Not Currently    Drug use: Never     Past Medical History:   Diagnosis Date    Anxiety     Arthritis     Hypertension      No past surgical history on file.    Charting was completed using voice recognition technology and may include unintended errors.

## 2024-12-24 ENCOUNTER — TELEPHONE (OUTPATIENT)
Dept: PRIMARY CARE | Facility: CLINIC | Age: 54
End: 2024-12-24
Payer: COMMERCIAL

## 2024-12-24 NOTE — TELEPHONE ENCOUNTER
----- Message from Nicole FOWLER sent at 12/23/2024 12:08 PM EST -----    ----- Message -----  From: Madi Nair DO  Sent: 12/23/2024  11:50 AM EST  To: Nicole Figueroa MA    Patient definitely needs an immediate pain management appointment for hydrocodone fill on jan 6

## 2024-12-24 NOTE — TELEPHONE ENCOUNTER
PATIENT HAS AN APPOINT MENT WITH DR MILLER ON 01/22/2025 AND IS ASKING IF THERE IS ANY WAY OUR OFFICE CAN GET HER IN ANY SOONER THERE BY CALLING THEM?  PLEASE ADVISE PATIENT

## 2024-12-27 ENCOUNTER — TELEPHONE (OUTPATIENT)
Dept: PRIMARY CARE | Facility: CLINIC | Age: 54
End: 2024-12-27
Payer: COMMERCIAL

## 2024-12-27 ENCOUNTER — TELEPHONE (OUTPATIENT)
Dept: PRIMARY CARE | Facility: CLINIC | Age: 54
End: 2024-12-27

## 2024-12-27 NOTE — TELEPHONE ENCOUNTER
PT STATES THAT SHE WAS DC FROM HOSPITAL AND TOLD THAT DR SIMON WOULD ORDER WHATEVER SHE NEEDED TIL DR GAONA CAME BACK. PREDNISONE?PAIN MANAGEMENT ORDERED ON 12/23/24

## 2024-12-29 DIAGNOSIS — M54.16 LUMBAR RADICULOPATHY: ICD-10-CM

## 2024-12-29 DIAGNOSIS — M43.17 ACQUIRED SPONDYLOLISTHESIS OF LUMBOSACRAL REGION: ICD-10-CM

## 2024-12-29 DIAGNOSIS — M47.27 LUMBOSACRAL SPONDYLOSIS WITH RADICULOPATHY: ICD-10-CM

## 2025-01-03 RX ORDER — PREDNISONE 10 MG/1
TABLET ORAL
Qty: 64 TABLET | Refills: 0 | Status: SHIPPED | OUTPATIENT
Start: 2025-01-03

## 2025-01-06 ENCOUNTER — APPOINTMENT (OUTPATIENT)
Dept: PRIMARY CARE | Facility: CLINIC | Age: 55
End: 2025-01-06
Payer: COMMERCIAL

## 2025-01-06 VITALS — WEIGHT: 290 LBS | BODY MASS INDEX: 54.75 KG/M2 | HEIGHT: 61 IN

## 2025-01-06 DIAGNOSIS — R60.9 EDEMA, UNSPECIFIED TYPE: ICD-10-CM

## 2025-01-06 DIAGNOSIS — F41.9 ANXIETY: ICD-10-CM

## 2025-01-06 DIAGNOSIS — M54.9 BACK PAIN, UNSPECIFIED BACK LOCATION, UNSPECIFIED BACK PAIN LATERALITY, UNSPECIFIED CHRONICITY: Primary | ICD-10-CM

## 2025-01-06 PROCEDURE — 3008F BODY MASS INDEX DOCD: CPT | Performed by: EMERGENCY MEDICINE

## 2025-01-06 PROCEDURE — 1036F TOBACCO NON-USER: CPT | Performed by: EMERGENCY MEDICINE

## 2025-01-06 PROCEDURE — 99214 OFFICE O/P EST MOD 30 MIN: CPT | Performed by: EMERGENCY MEDICINE

## 2025-01-06 RX ORDER — FUROSEMIDE 20 MG/1
20 TABLET ORAL DAILY
Qty: 30 TABLET | Refills: 1 | Status: SHIPPED | OUTPATIENT
Start: 2025-01-06 | End: 2026-01-06

## 2025-01-06 RX ORDER — DULOXETIN HYDROCHLORIDE 30 MG/1
60 CAPSULE, DELAYED RELEASE ORAL DAILY
Qty: 30 CAPSULE | Refills: 0 | Status: SHIPPED | OUTPATIENT
Start: 2025-01-06 | End: 2025-04-06

## 2025-01-06 RX ORDER — HYDROCODONE BITARTRATE AND ACETAMINOPHEN 5; 325 MG/1; MG/1
1 TABLET ORAL EVERY 6 HOURS PRN
Qty: 28 TABLET | Refills: 0 | Status: SHIPPED | OUTPATIENT
Start: 2025-01-06 | End: 2025-01-13

## 2025-01-06 ASSESSMENT — PATIENT HEALTH QUESTIONNAIRE - PHQ9
2. FEELING DOWN, DEPRESSED OR HOPELESS: NOT AT ALL
1. LITTLE INTEREST OR PLEASURE IN DOING THINGS: NOT AT ALL
SUM OF ALL RESPONSES TO PHQ9 QUESTIONS 1 AND 2: 0

## 2025-01-06 NOTE — PROGRESS NOTES
Subjective   Patient ID: Li Allison is a 54 y.o. female who presents for back pain.    HPI  Pt presents for physical and follow up    Patient presents with ongoing back pain for over a year.    Patient has a history of peripheral edema, back pain, and anxiety    PRIOR HISTORY -    Pt was  admitted to [facility]  on 6/13/2024 for increased shortness of breath leg swelling and weight gain concerning for congestive heart failure with elevated D-dimer 1394 and acute UTI.  Patient had bilateral DVT scan which was negative.  Patient declined CT PE treated with IV Cipro for UTI.  Cardiology and hematology oncology was consulted.  Patient was recommended to have IV Lasix and CT PE which she declined. ECHO with moderate left ventricular hypertrophy. Patient was doing much better on 6/14/2024 was discharged home with p.o. Lasix 40 mg daily advised to stop meloxicam and prednisone for back pain and improved swelling.  Advised to follow-up with PCP and cardiology.    Pt noticing  back and hip pain on R side. Pt has discontinued Meloxicam and is looking for a different medication for pain. Pt has tried Tylenol arthritis, Tylenol,  and states relief was not as good as Meloxicam. Describes as a nerve pain down back of R hip and goes down front of R thigh to R knee. Currently rates severity of pain 10/10. States she wants to be more active but cannot because of the pain limiting her physical activity. On chronic oral prednisone for more than 6-month.  Want to go off of it due to increased swelling and weight gain. Hesitant to try past pain medications again due to past fluid overload secondary to Meloxicam and steroid use.    Pt also endorses issues with sleeping. Pt taking hydroxyzine, keeps pt asleep for 1-2 hours. Pt awakes after 1-2 hours and walks around. Pt would like a different medication to help her sleep. Continue to c/o increased Anxiety  and thought raising.         Social History     Tobacco Use    Smoking status:  Never     Passive exposure: Never    Smokeless tobacco: Never   Vaping Use    Vaping status: Never Used   Substance Use Topics    Alcohol use: Not Currently    Drug use: Never       Allergies   Allergen Reactions    Methylprednisolone GI Upset    Penicillins Unknown and Rash       Current Outpatient Medications   Medication Sig Dispense Refill    albuterol (ProAir HFA) 90 mcg/actuation inhaler Inhale 2 puffs every 4 hours if needed for wheezing or shortness of breath. 18 g 5    clobetasol (Temovate) 0.05 % cream Apply topically 2 times a day.      DULoxetine (Cymbalta) 30 mg DR capsule TAKE 1 CAPSULE (30 MG) BY MOUTH ONCE DAILY. DO NOT CRUSH OR CHEW. (Patient taking differently: Take 2 capsules (60 mg) by mouth once daily. Do not crush or chew.) 90 capsule 1    fluticasone (Flonase) 50 mcg/actuation nasal spray Administer 2 sprays into each nostril once daily.      gabapentin (Neurontin) 100 mg capsule TAKE 1 CAPSULE BY MOUTH THREE TIMES A DAY 90 capsule 2    HYDROcodone-acetaminophen (Norco) 5-325 mg tablet Take 1 tablet by mouth every 6 hours if needed for severe pain (7 - 10) for up to 14 days. 56 tablet 0    hydrOXYzine HCL (Atarax) 25 mg tablet Take 1 tablet (25 mg) by mouth once daily at bedtime.      losartan (Cozaar) 50 mg tablet TAKE 1 TABLET BY MOUTH EVERY DAY 90 tablet 1    meloxicam (Mobic) 15 mg tablet TAKE 1 TABLET BY MOUTH EVERY DAY 90 tablet 3    predniSONE (Deltasone) 10 mg tablet 6 TABS DAILY X 2 DAYS, 5 DAILY X 2 DAYS, 4 DAILY X 2 , 3 DAILY X 2 , 2 DAILY X 7 , 1 DAILY X 2 WEEKS 64 tablet 0    spironolactone (Aldactone) 50 mg tablet TAKE 2 TABLETS (100 MG) BY MOUTH ONCE DAILY. ONCE THE SWELLING GOES DOWN, CUT THE DOSE TO HALF (Patient taking differently: Take 2 tablets (100 mg) by mouth once daily as needed. Once the swelling goes down, cut the dose to half) 60 tablet 1    torsemide (Demadex) 20 mg tablet TAKE 2 TABLETS (40 MG) BY MOUTH ONCE DAILY. ONCE THE SWELLING IS DOWN, CUT THE DOSE TO HALF 60  tablet 2    traZODone (Desyrel) 100 mg tablet Take 2 tablets (200 mg) by mouth as needed at bedtime for sleep. 180 tablet 1    cyclobenzaprine (Flexeril) 10 mg tablet TAKE 1 TABLET (10 MG) BY MOUTH 3 TIMES A DAY AS NEEDED FOR MUSCLE SPASMS FOR UP TO 10 DAYS. 30 tablet 0    furosemide (Lasix) 40 mg tablet Take 1 tablet (40 mg) by mouth once daily for 7 days. 7 tablet 0    Lasix 40 mg tablet Take 1 tablet (40 mg) by mouth once daily for 7 days. (Patient not taking: Reported on 1/6/2025) 7 tablet 0     No current facility-administered medications for this visit.       Physical Exam    General Appearance:  Alert, cooperative, no distress,   Head:  Normocephalic, atraumatic   Eyes:  PERRL, conjunctiva/corneas clear, EOM's intact,    Lungs:   Clear to auscultation bilaterally, respirations unlabored   Heart:  Regular rate and rhythm, S1 and S2 normal, no murmur,    Neurologic: Normal      Orders Only on 10/18/2024   Component Date Value Ref Range Status    [facility] Diff Review Interp 10/18/2024    Final                    Value:Granulocytic leukocytosis with no increase of basophils or eosinophils, compatible with infectious or reactive process.  Anemia with anisocytosis, microcytes, mild hypochromia and occasional target cells.  Clinical correlation is indicated for etiology.    Occasional giant platelets are noted.      Comment: REYNOLD SHORT    (Electronic Signature)  Date Verified 10/18/24     Orders Only on 10/17/2024   Component Date Value Ref Range Status    [facility] Appearance, U 10/17/2024 Clear  Clear Final    [facility] Protein, U 10/17/2024 Negative  Negative Final    [facility] Blood, U 10/17/2024 Negative  Negative Final    Comment: Blood, U:  Trace = 0.03-0.05 mg/dL  Small = 0.06-0.1 mg/dL  Moderate = 0.2-0.5 mg/dL  Large = 1.0 mg/dL and greater      [facility] Leukocyte Esterase, U 10/17/2024 Negative  Negative Final    Comment: Leukocyte Esterase, U:  Trace = 25 Sarahi/uL  Small = 75 Sarahi/uL  Moderate =  250 Sarahi/uL  Large = 500 Sarahi/uL and greater      [facility] Ketones, U 10/17/2024 Negative  Negative Final    [facility] Color, U 10/17/2024 Light-Yellow  Yellow Final    [facility] Urobilinogen Qual, U 10/17/2024 < 2 mg/dl  < 2 mg/dl Final    Comment:  Urobilinogen, U:  EU/dl and mg/dl are equivalent units.      [facility] Glucose Qual, U 10/17/2024 Negative  Negative Final    [facility] pH, U 10/17/2024 6.5  4.5 - 8.0 Final    [facility] U MICRO 10/17/2024 Not Indicated   Final    [facility] Specific Gravity, U 10/17/2024 1.011  1.001 - 1.035 Final    [facility] Bilirubin, U 10/17/2024 Negative  Negative Final    Comment:  Bilirubin, U:  Initial positive urine bilirubin results are not confirmed. Interfering substances may include elevated urobilinogen.    Trace = 0.5-1.0 mg/dL  Small = 2.0-4.0 mg/dL  Moderate = 6.0-8.0 mg/dL  Large = 10 mg/dl and greater      [facility] Nitrite, U 10/17/2024 Negative  Negative Final    [facility] D Dimer HS 10/17/2024 814 (H)  <=499 ng/mL FEU Final    Comment: Results rechecked and called to TP at office [facility] by MB 10/17/2024 13:17:31 EDTExclusion of PE and DVT  The D Dimer HS assay is reported in ng/ml Fibrinogen Equivalent Units (FEU). Per ?s instructions for use, a value less than 500ng/ml (FEU) may help to exclude DVT and /or PE in outpatients when the assay is used with a clinical pretest probability assessment.    D-Dimer used for DIC Screens  Assay results should be used with other information, including the clinical context in forming a diagnosis.      [facility] B-type Natriuretic Pept* 10/17/2024 40  0 - 100 pg/mL Final    [facility] DIFF? 10/17/2024 Yes   Final    [facility] Instr WBC 10/17/2024 15.3   Final    [facility] RBC 10/17/2024 4.58  4.20 - 5.40 x10 Final    Note: RBC morphology is normal unless otherwise stated.  Evaluation performed only if differential is requested.    [facility] HCT 10/17/2024 31.6 (L)  36.0 - 46.0 % Final    [facility] Platelet  10/17/2024 640 (H)  150 - 450 x10 Final    [facility] DxH Actions 10/17/2024 See Notes (A)   Final    Comment: Scan for RBC Morphology  Scan Slide. Perform manual diff if needed.  SNV      [facility] WBC 10/17/2024 15.3 (H)  4.5 - 11.0 x10 Final    [facility] MCHC 10/17/2024 30.9 (L)  32.0 - 37.0 g/dL Final    [facility] HGB 10/17/2024 9.8 (L)  12.0 - 16.0 g/dL Final    [facility] MCV 10/17/2024 69.0 (L)  80.0 - 100.0 fL Final    [facility] MPV 10/17/2024 7.2 (L)  7.4 - 10.4 fL Final    [facility] RDW 10/17/2024 20.7 (H)  11.5 - 14.5 % Final    [facility] MCH 10/17/2024 21.3 (L)  27.0 - 34.0 pg Final    [facility] Nucleated RBC 10/17/2024 0  /100WBC Final    [facility] HEM PATH REVIEW 10/17/2024 See Diff Review Interp   Final    [facility] Hypochromasia 10/17/2024 Mod/Mkd   Final    [facility] Absolute Mono Ct 10/17/2024 0.31  0.10 - 1.00 x1000 Final    [facility] Monocyte % 10/17/2024 2  % Final    [facility] Absolute Neutrophil Ct 10/17/2024 14.23 (H)  1.40 - 8.80 x1000 Final    [facility] Poikilocytosis 10/17/2024 Slight   Final    [facility] Band % 10/17/2024 0  % Final    [facility] Absolute Seg Ct 10/17/2024 14.23 (H)  1.40 - 8.80 x1000 Final    [facility] Left Shift 10/17/2024 Present (A)   Final    [facility] Microcyctosis 10/17/2024 Mod/Mkd   Final    [facility] Metamyelocyte % 10/17/2024 1  % Final    [facility] Polychromasia 10/17/2024 Slight   Final    [facility] Absolute Lymph Ct 10/17/2024 0.46 (L)  1.20 - 4.80 x1000 Final    [facility] Lymphocyte % 10/17/2024 3  % Final    [facility] Absolute Band Ct 10/17/2024 0.00  0.00 - 0.70 x1000 Final    [facility] Segmented Neut % 10/17/2024 93  % Final    [facility] Anisocytosis 10/17/2024 Mod/Mkd   Final    [facility] Ovalocytes 10/17/2024 Slight   Final    [facility] Myelocyte % 10/17/2024 1  % Final    [facility] Vitamin B12 10/17/2024 469  211 - 911 pg/mL Final    [facility] Glucose 10/17/2024 132 (H)  74 - 106 mg/dL Final    [facility] Alk Phos 10/17/2024  78  45 - 117 unit/L Final    [facility] GFR AA 10/17/2024 >60   Final    Comment:  GFR CalcMedical judgement is necessary to interpret GFR.  The calculated GFR may not accurately reflect renal status in patients >70 years, pregnant women, acutely ill hospitalized patients and patients with acute renal failure or known renal disease.  The MDRD GFR formula is valid only for adults greater than 18 years of age.  Note:  Creatinine clearance (not GFR) should be used for drug dosing.      [facility] Chloride 10/17/2024 105  98 - 107 mmol/L Final    [facility] Bilirubin, Total 10/17/2024 0.30  0.30 - 1.20 mg/dL Final    Use of this assay is not recommended for patients undergoing treatment with eltrombopag due to the potential for falsely elevated results.    [facility] ALB 10/17/2024 3.3 (L)  3.4 - 5.0 g/dL Final    [facility] BUN/Creat Ratio 10/17/2024 28.8   Final    [facility] Na 10/17/2024 140  135 - 145 mmol/L Final    [facility] A/G Ratio 10/17/2024 1.0   Final    [facility] GPT 10/17/2024 24  10 - 49 unit/L Final    [facility] Creatinine 10/17/2024 0.8  0.5 - 0.8 mg/dL Final    [facility] CO2, venous 10/17/2024 25.0  20.0 - 31.0 mmol/L Final    [facility] Total Protein 10/17/2024 6.6  5.7 - 8.2 g/dL Final    Total Protein results may be increased in patients receiving dextran as a blood volume expander    [facility] K 10/17/2024 4.6  3.5 - 5.1 mmol/L Final    [facility] Calculated Osmolality 10/17/2024 285  275 - 295 mOsm/kg Final    [facility] Glomerular Filtration R* 10/17/2024 >60  mL/min/1.73m? Final    Comment: Non- GFR CalcMedical judgement is necessary to interpret GFR.  The calculated GFR may not accurately reflect renal status in patients >70 years, pregnant women, acutely ill hospitalized patients and patients with acute renal failure or known renal disease.  The MDRD GFR formula is valid only for adults greater than 18 years of age.  Note:  Creatinine clearance (not GFR)  should be used for drug dosing.      [facility] Calcium 10/17/2024 9.7  8.7 - 10.4 mg/dL Final    [facility] Globulin 10/17/2024 3.3  g/dL Final    [facility] BUN 10/17/2024 23  9 - 23 mg/dL Final    Comment: -  Venipuncture should occur prior to N-Acetyl Cysteine (NAC) or Metamizole (Sulpyrine) administration due to the potential for falsely depressed results.  -  Blood samples from some patients with monoclonal gammopathies may produce falsely elevated results      [facility] GOT 10/17/2024 11 (L)  15 - 37 unit/L Final    [facility] Cholesterol 10/17/2024 222 (H)  100 - 200 mg/dL Final    Venipuncture should occur prior to N-Acetyl Cysteine (NAC) or Metamizole (Sulpyrine) administration due to the potential for falsely depressed results.    [facility] Calculated LDL Choleste* 10/17/2024 103  60 - 130 mg/dL Final    Comment: <100 mg/dl Optimal  100-129 mg/dl Near Optimal  130-159 mg/dl Borderline High  160-189 mg/dl High  >=190 mg/dl Very High      [facility] HDL Cholesterol 10/17/2024 91 (H)  40 - 60 mg/dL Final    Direct HDL Venipuncture should occur prior to metamizole (sulpyrine) administration due to the potential for falsely depressed results    [facility] Total Chol/HDL Chol Rat* 10/17/2024 2.4   Final    [facility] Triglycerides 10/17/2024 139  30 - 150 mg/dL Final    Comment: -  Venipuncture should occur prior to N-Acetyl Cysteine (NAC) or Metamizole (Sulpyrine) administration due to the potential for falsely depressed results  -  Use of this assay is not recommended for patients being treated with etamsylate because it causes falsely decreased results         Assessment/Plan   Patient presents for follow up and physical    Hypertension - Patient states that BP is consistently high, We will prescribe losartan and monitor.    Back and hip pain - Patient states that gabapentin is no longer benefiting her, she is on prednisone and meloxicam daily, along with flexeril as necessary. Pain focused in right hip  and lower back. We will provide a referral for orthopedic surgeon as per patient request. Order for x-ray provided, patient refused MRI.    Peripheral edema - Patient takes torsemide and aldactone as necessary, counseled to reduce fluid intake.    GERD - Patient complains of consistent heartburn. Recommended OTC prilosec and metamucil daily.    Anxiety - Patient is on 60 mg Cymbalta.    Preventative care -  Order for cologuard provided    PH Q-9 depression screening was completed by authorized employee of the practice for 5-10 minutes and  explained the questionnaire and discussed the answers with the patient.     Alcohol screening was completed for 5 to 10 minutes    Follow up in 3 months or as necessary

## 2025-01-08 ENCOUNTER — APPOINTMENT (OUTPATIENT)
Dept: PRIMARY CARE | Facility: CLINIC | Age: 55
End: 2025-01-08
Payer: COMMERCIAL

## 2025-01-13 ENCOUNTER — TELEPHONE (OUTPATIENT)
Dept: PRIMARY CARE | Facility: CLINIC | Age: 55
End: 2025-01-13
Payer: COMMERCIAL

## 2025-01-13 NOTE — TELEPHONE ENCOUNTER
Pt is asking if she can get medication for her edema. Currently taking spironolactone 100 mg and torsemide 40mg, lasix 40 daily. Says she doesn't like taking these since they make her go to the bathroom frequently and her hip and back hurt to be getting up. Last virtual appt was on 1/6/25. Please advice.

## 2025-01-17 ENCOUNTER — TELEPHONE (OUTPATIENT)
Dept: PRIMARY CARE | Facility: CLINIC | Age: 55
End: 2025-01-17
Payer: COMMERCIAL

## 2025-01-17 DIAGNOSIS — M79.89 LEG SWELLING: ICD-10-CM

## 2025-01-17 RX ORDER — SPIRONOLACTONE 100 MG/1
100 TABLET, FILM COATED ORAL DAILY PRN
Qty: 90 TABLET | Refills: 1 | Status: SHIPPED | OUTPATIENT
Start: 2025-01-17

## 2025-01-24 ENCOUNTER — TELEPHONE (OUTPATIENT)
Dept: PRIMARY CARE | Facility: CLINIC | Age: 55
End: 2025-01-24
Payer: COMMERCIAL

## 2025-01-24 DIAGNOSIS — M43.17 ACQUIRED SPONDYLOLISTHESIS OF LUMBOSACRAL REGION: ICD-10-CM

## 2025-01-24 DIAGNOSIS — M54.16 LUMBAR RADICULOPATHY: ICD-10-CM

## 2025-01-24 DIAGNOSIS — F41.9 ANXIETY: ICD-10-CM

## 2025-01-24 DIAGNOSIS — M47.27 LUMBOSACRAL SPONDYLOSIS WITH RADICULOPATHY: ICD-10-CM

## 2025-01-24 RX ORDER — DULOXETIN HYDROCHLORIDE 30 MG/1
30 CAPSULE, DELAYED RELEASE ORAL DAILY
Qty: 90 CAPSULE | Refills: 1 | Status: SHIPPED | OUTPATIENT
Start: 2025-01-24

## 2025-01-24 NOTE — TELEPHONE ENCOUNTER
PT STATES THAT SHE IS TO SEE BACK DOCTOR TODAY. SHE IS INSISTENT ON A PRESCRIPTION FOR HYDROCODONE. SHE STATES THAT SHE WS IN HOSPITAL IN DEC AND WAS TOLD BY DR SIMON SHE CAN CALL OFFICE TO GET THIS MEDICATION. SHE DID STATE THAT DR GAONA ORDERED SOME PILLS FOR HER BUT CUT HER WAY BACK. SHE ALSO ASKED FOR PREDNISONE AND ASKED IF SHE PICKED UP RX FROM DR RIOS FROM 1/3/25 AND SAID SHE DIDN'T KNOW ABOUT IT.  I EXPLAINED THAT I WOULD SEND MESSAGE TO DR GAONA, BUT NEEDS TO SEE WHAT BACK DOCTOR SAYS RE MEDICATION.

## 2025-01-27 RX ORDER — PREDNISONE 10 MG/1
TABLET ORAL
Qty: 64 TABLET | Refills: 0 | Status: SHIPPED | OUTPATIENT
Start: 2025-01-27

## 2025-01-29 DIAGNOSIS — R60.9 EDEMA, UNSPECIFIED TYPE: ICD-10-CM

## 2025-01-30 RX ORDER — FUROSEMIDE 20 MG/1
20 TABLET ORAL DAILY
Qty: 90 TABLET | Refills: 1 | Status: SHIPPED | OUTPATIENT
Start: 2025-01-30

## 2025-01-31 DIAGNOSIS — G47.00 INSOMNIA, UNSPECIFIED TYPE: ICD-10-CM

## 2025-02-05 RX ORDER — TRAZODONE HYDROCHLORIDE 100 MG/1
100 TABLET ORAL NIGHTLY PRN
Qty: 90 TABLET | Refills: 1 | Status: SHIPPED | OUTPATIENT
Start: 2025-02-05

## 2025-02-07 DIAGNOSIS — G47.00 INSOMNIA, UNSPECIFIED TYPE: ICD-10-CM

## 2025-02-07 DIAGNOSIS — M47.27 LUMBOSACRAL SPONDYLOSIS WITH RADICULOPATHY: ICD-10-CM

## 2025-02-07 DIAGNOSIS — M43.17 ACQUIRED SPONDYLOLISTHESIS OF LUMBOSACRAL REGION: ICD-10-CM

## 2025-02-07 DIAGNOSIS — M54.16 LUMBAR RADICULOPATHY: ICD-10-CM

## 2025-02-10 RX ORDER — TRAZODONE HYDROCHLORIDE 100 MG/1
TABLET ORAL
Qty: 180 TABLET | Refills: 1 | Status: SHIPPED | OUTPATIENT
Start: 2025-02-10

## 2025-02-10 RX ORDER — PREDNISONE 10 MG/1
TABLET ORAL
Qty: 64 TABLET | Refills: 0 | Status: SHIPPED | OUTPATIENT
Start: 2025-02-10

## 2025-03-04 ENCOUNTER — TELEPHONE (OUTPATIENT)
Dept: PRIMARY CARE | Facility: CLINIC | Age: 55
End: 2025-03-04
Payer: COMMERCIAL

## 2025-03-05 ENCOUNTER — TELEPHONE (OUTPATIENT)
Dept: PRIMARY CARE | Facility: CLINIC | Age: 55
End: 2025-03-05
Payer: COMMERCIAL

## 2025-03-07 DIAGNOSIS — M43.17 ACQUIRED SPONDYLOLISTHESIS OF LUMBOSACRAL REGION: ICD-10-CM

## 2025-03-07 DIAGNOSIS — M47.27 LUMBOSACRAL SPONDYLOSIS WITH RADICULOPATHY: ICD-10-CM

## 2025-03-07 DIAGNOSIS — M54.16 LUMBAR RADICULOPATHY: ICD-10-CM

## 2025-03-07 RX ORDER — PREDNISONE 10 MG/1
TABLET ORAL
Qty: 64 TABLET | Refills: 0 | Status: SHIPPED | OUTPATIENT
Start: 2025-03-07

## 2025-03-17 ENCOUNTER — TELEPHONE (OUTPATIENT)
Dept: PRIMARY CARE | Facility: CLINIC | Age: 55
End: 2025-03-17
Payer: COMMERCIAL

## 2025-03-17 DIAGNOSIS — R60.9 EDEMA, UNSPECIFIED TYPE: ICD-10-CM

## 2025-03-17 RX ORDER — FUROSEMIDE 40 MG/1
40 TABLET ORAL
Qty: 60 TABLET | Refills: 1 | Status: SHIPPED | OUTPATIENT
Start: 2025-03-17

## 2025-03-17 NOTE — TELEPHONE ENCOUNTER
Patient called, wanting to be seen by Dr. Rader today, due to legs being swollen and leaking. Patient declined appointments offered as she does not have a ride. Wednesday appointments were offered as well, cannot make them due to  working 3rd shift, he sleeps in the morning and leaves for work at 3:30 PM. Patient wanted to know if she can be admitted to the hospital, notified patient that Dr. Rader would need to see patient first before deciding whether or not she should be admitted to the hospital.

## 2025-03-17 NOTE — TELEPHONE ENCOUNTER
Please advice, Pt is currently taking lasix 20mg BID no other water pill, says that she uses towels underneath her legs and they get soaked within a couple hours. Said that she wants to come in to have her legs seen but doesn't have a ride until Friday. Patient wanted to schedule for Friday, but would like to know if you can adjust her meds in the meantime.

## 2025-03-18 DIAGNOSIS — R06.02 SOBOE (SHORTNESS OF BREATH ON EXERTION): ICD-10-CM

## 2025-03-18 DIAGNOSIS — I10 PRIMARY HYPERTENSION: ICD-10-CM

## 2025-03-20 RX ORDER — LOSARTAN POTASSIUM 50 MG/1
50 TABLET ORAL DAILY
Qty: 90 TABLET | Refills: 1 | Status: SHIPPED | OUTPATIENT
Start: 2025-03-20

## 2025-03-20 RX ORDER — ALBUTEROL SULFATE 90 UG/1
2 INHALANT RESPIRATORY (INHALATION) EVERY 4 HOURS PRN
Qty: 18 G | Refills: 5 | Status: SHIPPED | OUTPATIENT
Start: 2025-03-20

## 2025-03-21 ENCOUNTER — OFFICE VISIT (OUTPATIENT)
Dept: PRIMARY CARE | Facility: CLINIC | Age: 55
End: 2025-03-21
Payer: COMMERCIAL

## 2025-03-21 VITALS
SYSTOLIC BLOOD PRESSURE: 105 MMHG | OXYGEN SATURATION: 92 % | WEIGHT: 293 LBS | HEIGHT: 62 IN | HEART RATE: 82 BPM | DIASTOLIC BLOOD PRESSURE: 71 MMHG | BODY MASS INDEX: 53.92 KG/M2

## 2025-03-21 DIAGNOSIS — A49.9 BACTERIAL INFECTION: Primary | ICD-10-CM

## 2025-03-21 DIAGNOSIS — R60.0 PERIPHERAL EDEMA: ICD-10-CM

## 2025-03-21 RX ORDER — SPIRONOLACTONE 100 MG/1
100 TABLET, FILM COATED ORAL DAILY
Qty: 15 TABLET | Refills: 0 | Status: SHIPPED | OUTPATIENT
Start: 2025-03-21 | End: 2025-04-05

## 2025-03-21 RX ORDER — DOXYCYCLINE HYCLATE 100 MG
100 TABLET ORAL 2 TIMES DAILY
Qty: 28 TABLET | Refills: 0 | Status: SHIPPED | OUTPATIENT
Start: 2025-03-21 | End: 2025-04-04

## 2025-03-21 RX ORDER — PREGABALIN 300 MG/1
300 CAPSULE ORAL EVERY 12 HOURS
COMMUNITY

## 2025-03-21 RX ORDER — TRAMADOL HYDROCHLORIDE 50 MG/1
50 TABLET ORAL EVERY 12 HOURS
COMMUNITY

## 2025-03-21 NOTE — PROGRESS NOTES
Subjective   Patient ID: Li Allison is a 54 y.o. female who presents for pedal edema    HPI  Pt presents for physical and follow up    Patient has severe bilateral pedal edema.  This has been ongoing since November  She has not seek any medical care for this other than virtual visits    Patient presents with ongoing back pain for over a year.    Patient has a history of peripheral edema, back pain, and anxiety    PRIOR HISTORY -    Pt was  admitted to [facility]  on 6/13/2024 for increased shortness of breath leg swelling and weight gain concerning for congestive heart failure with elevated D-dimer 1394 and acute UTI.  Patient had bilateral DVT scan which was negative.  Patient declined CT PE treated with IV Cipro for UTI.  Cardiology and hematology oncology was consulted.  Patient was recommended to have IV Lasix and CT PE which she declined. ECHO with moderate left ventricular hypertrophy. Patient was doing much better on 6/14/2024 was discharged home with p.o. Lasix 40 mg daily advised to stop meloxicam and prednisone for back pain and improved swelling.  Advised to follow-up with PCP and cardiology.    Pt noticing  back and hip pain on R side. Pt has discontinued Meloxicam and is looking for a different medication for pain. Pt has tried Tylenol arthritis, Tylenol,  and states relief was not as good as Meloxicam. Describes as a nerve pain down back of R hip and goes down front of R thigh to R knee. Currently rates severity of pain 10/10. States she wants to be more active but cannot because of the pain limiting her physical activity. On chronic oral prednisone for more than 6-month.  Want to go off of it due to increased swelling and weight gain. Hesitant to try past pain medications again due to past fluid overload secondary to Meloxicam and steroid use.    Pt also endorses issues with sleeping. Pt taking hydroxyzine, keeps pt asleep for 1-2 hours. Pt awakes after 1-2 hours and walks around. Pt would like a  different medication to help her sleep. Continue to c/o increased Anxiety  and thought raising.         Social History     Tobacco Use    Smoking status: Never     Passive exposure: Never    Smokeless tobacco: Never   Vaping Use    Vaping status: Never Used   Substance Use Topics    Alcohol use: Not Currently    Drug use: Never       Allergies   Allergen Reactions    Methylprednisolone GI Upset    Penicillins Unknown and Rash       Current Outpatient Medications   Medication Sig Dispense Refill    albuterol 90 mcg/actuation inhaler INHALE 2 PUFFS EVERY 4 HOURS IF NEEDED FOR WHEEZING OR SHORTNESS OF BREATH. 18 g 5    fluticasone (Flonase) 50 mcg/actuation nasal spray Administer 2 sprays into each nostril once daily.      furosemide (Lasix) 40 mg tablet Take 1 tablet (40 mg) by mouth 2 times daily (morning and late afternoon). 60 tablet 1    meloxicam (Mobic) 15 mg tablet TAKE 1 TABLET BY MOUTH EVERY DAY 90 tablet 3    potassium (POTASSIMIN ORAL) Take 50 mg by mouth.      pregabalin (Lyrica) 300 mg capsule Take 1 capsule (300 mg) by mouth every 12 hours.      traMADol (Ultram) 50 mg tablet Take 1 tablet (50 mg) by mouth every 12 hours. Take 1 to 2 tablets      traZODone (Desyrel) 100 mg tablet TAKE 2 TABLETS (200 MG) BY MOUTH AS NEEDED AT BEDTIME FOR SLEEP. (Patient taking differently: 1 tablet (100 mg).) 180 tablet 1    cyclobenzaprine (Flexeril) 10 mg tablet TAKE 1 TABLET (10 MG) BY MOUTH 3 TIMES A DAY AS NEEDED FOR MUSCLE SPASMS FOR UP TO 10 DAYS. 30 tablet 0    DULoxetine (Cymbalta) 30 mg DR capsule TAKE 1 CAPSULE (30 MG) BY MOUTH ONCE DAILY. DO NOT CRUSH OR CHEW. 90 capsule 1    furosemide (Lasix) 20 mg tablet TAKE 1 TABLET BY MOUTH EVERY DAY (Patient not taking: Reported on 3/21/2025) 90 tablet 1    gabapentin (Neurontin) 100 mg capsule TAKE 1 CAPSULE BY MOUTH THREE TIMES A DAY (Patient not taking: Reported on 3/21/2025) 90 capsule 2    Lasix 40 mg tablet Take 1 tablet (40 mg) by mouth once daily for 7 days. 7  tablet 0    losartan (Cozaar) 50 mg tablet TAKE 1 TABLET BY MOUTH EVERY DAY 90 tablet 1    predniSONE (Deltasone) 10 mg tablet 6 TABS X 2 DAYS, 5 TABS X 2 DAYS, 4 TABS X 2 DAYS, 3 TABS X 2 DAYS, 2 TABS X 7 DAYS, 1 TAB X 2 WEEKS (Patient not taking: Reported on 3/21/2025) 64 tablet 0    spironolactone (Aldactone) 100 mg tablet Take 1 tablet (100 mg) by mouth once daily as needed (swelling). (Patient not taking: Reported on 3/21/2025) 90 tablet 1    spironolactone (Aldactone) 50 mg tablet TAKE 2 TABLETS (100 MG) BY MOUTH ONCE DAILY. ONCE THE SWELLING GOES DOWN, CUT THE DOSE TO HALF (Patient not taking: Reported on 3/21/2025) 60 tablet 1    torsemide (Demadex) 20 mg tablet TAKE 2 TABLETS (40 MG) BY MOUTH ONCE DAILY. ONCE THE SWELLING IS DOWN, CUT THE DOSE TO HALF (Patient not taking: Reported on 3/21/2025) 60 tablet 2     No current facility-administered medications for this visit.       Physical Exam    General Appearance:  Alert, cooperative, no distress,   Head:  Normocephalic, atraumatic   Eyes:  PERRL, conjunctiva/corneas clear, EOM's intact,    Lungs:   Clear to auscultation bilaterally, respirations unlabored   Heart:  Regular rate and rhythm, S1 and S2 normal, no murmur,    Neurologic: Normal      No visits with results within 3 Month(s) from this visit.   Latest known visit with results is:   Orders Only on 10/18/2024   Component Date Value Ref Range Status    [facility] Diff Review Interp 10/18/2024    Final                    Value:Granulocytic leukocytosis with no increase of basophils or eosinophils, compatible with infectious or reactive process.  Anemia with anisocytosis, microcytes, mild hypochromia and occasional target cells.  Clinical correlation is indicated for etiology.    Occasional giant platelets are noted.      Comment: REYNOLD SHORT    (Electronic Signature)  Date Verified 10/18/24         Assessment/Plan   Patient presents for follow up and physical    Severe bilateral pedal edema with  infected ulcers-I will treat her with high doses of diuretics and antibiotics to see if this can improve    We will also do bilateral venous duplex    Patient was counseled that the best option would be for her to be admitted to the hospital.  Patient does not want to be admitted at this time    Hypertension - Patient states that BP is consistently high, We will prescribe losartan and monitor.    Back and hip pain - Patient states that gabapentin is no longer benefiting her, she is on prednisone and meloxicam daily, along with flexeril as necessary. Pain focused in right hip and lower back. We will provide a referral for orthopedic surgeon as per patient request. Order for x-ray provided, patient refused MRI.    GERD - Patient complains of consistent heartburn. Recommended OTC prilosec and metamucil daily.    Anxiety - Patient is on 60 mg Cymbalta.    Preventative care -  Order for hilario provided    Follow up in 3 months or as necessary

## 2025-03-24 ENCOUNTER — TELEPHONE (OUTPATIENT)
Dept: PRIMARY CARE | Facility: CLINIC | Age: 55
End: 2025-03-24
Payer: COMMERCIAL

## 2025-03-24 DIAGNOSIS — R60.0 PERIPHERAL EDEMA: ICD-10-CM

## 2025-03-24 NOTE — TELEPHONE ENCOUNTER
Called and left  with information. She was not given MRI in last appt. Left message to call back on more information about this

## 2025-03-24 NOTE — TELEPHONE ENCOUNTER
Patient had an apt Friday. Was informed by Dr. Rader he would talk to Dr. Squires in regards to MRI approval ? Wanted to know if Dr. Squires was contacted.

## 2025-03-25 NOTE — TELEPHONE ENCOUNTER
Li called saying that she wants the MRI. She said that you told her you would talk to Dr. Squires about getting this approved?An Mri was not ordered last visit because this was not mentioned. Would like PT too. Says that more than likely she will be admitted to the hospital. Said she is having transportation issues and doesn't know when she can be admitted. In VM she mentioned that she refuses to go to ER. Please advice

## 2025-03-26 NOTE — TELEPHONE ENCOUNTER
Pt is aware. She said that you told her to call back and ask you if she can be admitted to the hospital. Asked if you could find out if there is a bed for her so that she can go on a Friday morning

## 2025-03-28 RX ORDER — BUMETANIDE 2 MG/1
1 TABLET ORAL DAILY
Qty: 30 TABLET | Refills: 2 | Status: SHIPPED | OUTPATIENT
Start: 2025-03-28

## 2025-03-28 NOTE — TELEPHONE ENCOUNTER
Patient called and is as asking to be directly admitted to the hospital by dr miguel.  I spoke to dr miguel and he said to let the patient know that there is no guarantee there will be a bed available for her but he will work on it and we will be in touch with her.    I did call the patient to let her know what dr miguel said  Thanks    This is just an fyi

## 2025-04-03 ENCOUNTER — TELEPHONE (OUTPATIENT)
Dept: PRIMARY CARE | Facility: CLINIC | Age: 55
End: 2025-04-03
Payer: COMMERCIAL

## 2025-04-04 ENCOUNTER — TELEMEDICINE (OUTPATIENT)
Dept: PRIMARY CARE | Facility: CLINIC | Age: 55
End: 2025-04-04
Payer: COMMERCIAL

## 2025-04-04 VITALS — HEIGHT: 62 IN | WEIGHT: 293 LBS | BODY MASS INDEX: 53.92 KG/M2

## 2025-04-04 DIAGNOSIS — D50.9 IRON DEFICIENCY ANEMIA, UNSPECIFIED IRON DEFICIENCY ANEMIA TYPE: ICD-10-CM

## 2025-04-04 DIAGNOSIS — M79.89 LEG SWELLING: ICD-10-CM

## 2025-04-04 DIAGNOSIS — F41.9 ANXIETY: ICD-10-CM

## 2025-04-04 DIAGNOSIS — Z76.89 ENCOUNTER FOR SUPPORT AND COORDINATION OF TRANSITION OF CARE: ICD-10-CM

## 2025-04-04 DIAGNOSIS — A49.9 BACTERIAL INFECTION: Primary | ICD-10-CM

## 2025-04-04 DIAGNOSIS — L03.90 CELLULITIS, UNSPECIFIED CELLULITIS SITE: ICD-10-CM

## 2025-04-04 PROCEDURE — 3008F BODY MASS INDEX DOCD: CPT | Performed by: EMERGENCY MEDICINE

## 2025-04-04 PROCEDURE — 99496 TRANSJ CARE MGMT HIGH F2F 7D: CPT | Performed by: EMERGENCY MEDICINE

## 2025-04-04 RX ORDER — DOXYCYCLINE HYCLATE 50 MG/1
50 TABLET, FILM COATED ORAL 2 TIMES DAILY
Qty: 14 TABLET | Refills: 0 | Status: SHIPPED | OUTPATIENT
Start: 2025-04-04 | End: 2025-04-11

## 2025-04-04 NOTE — PROGRESS NOTES
Subjective   Patient ID: Li Allison is a 54 y.o. female who presents for transition of care  HPI  Patient was discharged from [facility] on April 2_ and there was interactive contact by patient/family or facility staff on behalf of patient with me/office within 2 working days regarding patient's transition    Patient was recently admitted to the hospital.  Patient's history regarding the reason for hospital admission and the course in the hospital was reviewed with patinet and/or family.  Patient's medical records including lab work, radiology and other medical notes were reviewed.  His medication list prior to admission and discharge medication list are compared and updated.     This visit was completed virtually due to the restrictions of the COVID-19 pandemic. All issues as below were discussed and addressed but no physical exam was performed. If it was felt that the patient should be evaluated in clinic or ER, then they were directed there. The patient verbally consented to visit    Patient is unable to give detailed history and therefore history is obtained from the chart    History from hospitalization-  Patient was admitted with bilateral lower extremity cellulitis and anemia.  She was treated with antibiotics.  ID consult was obtained.  She got IV iron.  She underwent EGD that did not show any active bleeding   H&H was stable and she was discharged    Patient has severe bilateral pedal edema.  This has been ongoing since November  She has not seek any medical care for this other than virtual visits    Patient presents with ongoing back pain for over a year.    Patient has a history of peripheral edema, back pain, and anxiety    PRIOR HISTORY -    Pt was  admitted to [facility]  on 6/13/2024 for increased shortness of breath leg swelling and weight gain concerning for congestive heart failure with elevated D-dimer 1394 and acute UTI.  Patient had bilateral DVT scan which was negative.   Patient declined CT PE treated with IV Cipro for UTI.  Cardiology and hematology oncology was consulted.  Patient was recommended to have IV Lasix and CT PE which she declined. ECHO with moderate left ventricular hypertrophy. Patient was doing much better on 6/14/2024 was discharged home with p.o. Lasix 40 mg daily advised to stop meloxicam and prednisone for back pain and improved swelling.  Advised to follow-up with PCP and cardiology.    Pt noticing  back and hip pain on R side. Pt has discontinued Meloxicam and is looking for a different medication for pain. Pt has tried Tylenol arthritis, Tylenol,  and states relief was not as good as Meloxicam. Describes as a nerve pain down back of R hip and goes down front of R thigh to R knee. Currently rates severity of pain 10/10. States she wants to be more active but cannot because of the pain limiting her physical activity. On chronic oral prednisone for more than 6-month.  Want to go off of it due to increased swelling and weight gain. Hesitant to try past pain medications again due to past fluid overload secondary to Meloxicam and steroid use.    Pt also endorses issues with sleeping. Pt taking hydroxyzine, keeps pt asleep for 1-2 hours. Pt awakes after 1-2 hours and walks around. Pt would like a different medication to help her sleep. Continue to c/o increased Anxiety  and thought raising.         Social History     Tobacco Use    Smoking status: Never     Passive exposure: Never    Smokeless tobacco: Never   Vaping Use    Vaping status: Never Used   Substance Use Topics    Alcohol use: Not Currently    Drug use: Never       Allergies   Allergen Reactions    Methylprednisolone GI Upset    Penicillins Unknown and Rash       Current Outpatient Medications   Medication Sig Dispense Refill    albuterol 90 mcg/actuation inhaler INHALE 2 PUFFS EVERY 4 HOURS IF NEEDED FOR WHEEZING OR SHORTNESS OF BREATH. 18 g 5    bumetanide (Bumex) 2 mg tablet Take 0.5 tablets (1 mg) by  mouth once daily. 30 tablet 2    doxycycline (Vibra-Tabs) 100 mg tablet Take 1 tablet (100 mg) by mouth 2 times a day for 14 days. Take with a full glass of water and do not lie down for at least 30 minutes after. 28 tablet 0    DULoxetine (Cymbalta) 30 mg DR capsule TAKE 1 CAPSULE (30 MG) BY MOUTH ONCE DAILY. DO NOT CRUSH OR CHEW. 90 capsule 1    fluticasone (Flonase) 50 mcg/actuation nasal spray Administer 2 sprays into each nostril once daily.      furosemide (Lasix) 40 mg tablet Take 1 tablet (40 mg) by mouth 2 times daily (morning and late afternoon). 60 tablet 1    Lasix 40 mg tablet Take 1 tablet (40 mg) by mouth once daily for 7 days. 7 tablet 0    losartan (Cozaar) 50 mg tablet TAKE 1 TABLET BY MOUTH EVERY DAY 90 tablet 1    meloxicam (Mobic) 15 mg tablet TAKE 1 TABLET BY MOUTH EVERY DAY 90 tablet 3    potassium (POTASSIMIN ORAL) Take 50 mg by mouth.      pregabalin (Lyrica) 300 mg capsule Take 1 capsule (300 mg) by mouth every 12 hours.      spironolactone (Aldactone) 100 mg tablet Take 1 tablet (100 mg) by mouth once daily for 15 days. 15 tablet 0    traMADol (Ultram) 50 mg tablet Take 1 tablet (50 mg) by mouth every 12 hours. Take 1 to 2 tablets      cyclobenzaprine (Flexeril) 10 mg tablet TAKE 1 TABLET (10 MG) BY MOUTH 3 TIMES A DAY AS NEEDED FOR MUSCLE SPASMS FOR UP TO 10 DAYS. 30 tablet 0    furosemide (Lasix) 20 mg tablet TAKE 1 TABLET BY MOUTH EVERY DAY (Patient not taking: Reported on 4/4/2025) 90 tablet 1    gabapentin (Neurontin) 100 mg capsule TAKE 1 CAPSULE BY MOUTH THREE TIMES A DAY (Patient not taking: Reported on 4/4/2025) 90 capsule 2    predniSONE (Deltasone) 10 mg tablet 6 TABS X 2 DAYS, 5 TABS X 2 DAYS, 4 TABS X 2 DAYS, 3 TABS X 2 DAYS, 2 TABS X 7 DAYS, 1 TAB X 2 WEEKS (Patient not taking: Reported on 4/4/2025) 64 tablet 0    spironolactone (Aldactone) 100 mg tablet Take 1 tablet (100 mg) by mouth once daily as needed (swelling). (Patient not taking: Reported on 4/4/2025) 90 tablet 1     spironolactone (Aldactone) 50 mg tablet TAKE 2 TABLETS (100 MG) BY MOUTH ONCE DAILY. ONCE THE SWELLING GOES DOWN, CUT THE DOSE TO HALF (Patient not taking: Reported on 4/4/2025) 60 tablet 1    torsemide (Demadex) 20 mg tablet TAKE 2 TABLETS (40 MG) BY MOUTH ONCE DAILY. ONCE THE SWELLING IS DOWN, CUT THE DOSE TO HALF (Patient not taking: Reported on 4/4/2025) 60 tablet 2    traZODone (Desyrel) 100 mg tablet TAKE 2 TABLETS (200 MG) BY MOUTH AS NEEDED AT BEDTIME FOR SLEEP. (Patient not taking: Reported on 4/4/2025) 180 tablet 1     No current facility-administered medications for this visit.       Physical Exam    General Appearance:  Alert, cooperative, no distress,   Head:  Normocephalic, atraumatic   Eyes:  PERRL, conjunctiva/corneas clear, EOM's intact,    Lungs:   Clear to auscultation bilaterally, respirations unlabored   Heart:  Regular rate and rhythm, S1 and S2 normal, no murmur,    Neurologic: Normal      No visits with results within 3 Month(s) from this visit.   Latest known visit with results is:   Orders Only on 10/18/2024   Component Date Value Ref Range Status    [facility] HIE Diff Review Interp 10/18/2024    Final                    Value:Granulocytic leukocytosis with no increase of basophils or eosinophils, compatible with infectious or reactive process.  Anemia with anisocytosis, microcytes, mild hypochromia and occasional target cells.  Clinical correlation is indicated for etiology.    Occasional giant platelets are noted.      Comment: REYNOLD SHORT    (Electronic Signature)  Date Verified 10/18/24         Assessment/Plan   Patient presents for transitional care management    Bilateral lower extremity cellulitis-improved.  Will give her doxycycline    Lower extremity edema with weeping-instructed her extensively regarding how to take diuretics.  She will take torsemide and Aldactone initially at higher dose and then once swelling is down she will cut the dose down    Difficulty performing ADLs-will  order home health physical therapy    Hypertension - Patient states that BP is consistently high, We will prescribe losartan and monitor.    Back and hip pain - Patient states that gabapentin is no longer benefiting her, she is on prednisone and meloxicam daily, along with flexeril as necessary. Pain focused in right hip and lower back. We will provide a referral for orthopedic surgeon as per patient request. Order for x-ray provided, patient refused MRI.    GERD - Patient complains of consistent heartburn. Recommended OTC prilosec and metamucil daily.    Anxiety - Patient is on 60 mg Cymbalta.    Preventative care -  Order for hilario provided    Follow up in 3 months or as necessary

## 2025-04-08 ENCOUNTER — TELEPHONE (OUTPATIENT)
Dept: PRIMARY CARE | Facility: CLINIC | Age: 55
End: 2025-04-08
Payer: COMMERCIAL

## 2025-04-08 DIAGNOSIS — A49.9 BACTERIAL INFECTION: ICD-10-CM

## 2025-04-08 DIAGNOSIS — R60.9 EDEMA, UNSPECIFIED TYPE: ICD-10-CM

## 2025-04-08 RX ORDER — TORSEMIDE 20 MG/1
20 TABLET ORAL DAILY
Qty: 30 TABLET | Refills: 1 | Status: CANCELLED | OUTPATIENT
Start: 2025-04-08 | End: 2026-04-08

## 2025-04-09 ENCOUNTER — TELEPHONE (OUTPATIENT)
Dept: PRIMARY CARE | Facility: CLINIC | Age: 55
End: 2025-04-09
Payer: COMMERCIAL

## 2025-04-09 RX ORDER — SPIRONOLACTONE 50 MG/1
50 TABLET, FILM COATED ORAL DAILY
Qty: 30 TABLET | Refills: 1 | Status: SHIPPED | OUTPATIENT
Start: 2025-04-09

## 2025-04-09 RX ORDER — DOXYCYCLINE 100 MG/1
100 CAPSULE ORAL 2 TIMES DAILY
Qty: 14 CAPSULE | Refills: 0 | Status: SHIPPED | OUTPATIENT
Start: 2025-04-09 | End: 2025-04-16

## 2025-04-10 ENCOUNTER — TELEPHONE (OUTPATIENT)
Dept: PRIMARY CARE | Facility: CLINIC | Age: 55
End: 2025-04-10
Payer: COMMERCIAL

## 2025-04-10 DIAGNOSIS — Z00.00 ROUTINE GENERAL MEDICAL EXAMINATION AT A HEALTH CARE FACILITY: ICD-10-CM
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
## 2025-04-11 NOTE — TELEPHONE ENCOUNTER
Patient is requesting a change from albuetrol inahler to brestri inhaler. She believes it will work better than the albuetrol inhaler she is already taking.    [facility]

## 2025-04-13 DIAGNOSIS — R60.9 EDEMA, UNSPECIFIED TYPE: ICD-10-CM

## 2025-04-14 RX ORDER — BUDESONIDE, GLYCOPYRROLATE, AND FORMOTEROL FUMARATE 160; 9; 4.8 UG/1; UG/1; UG/1
2 AEROSOL, METERED RESPIRATORY (INHALATION) 2 TIMES DAILY
Qty: 10.7 G | Refills: 2 | Status: SHIPPED | OUTPATIENT
Start: 2025-04-14

## 2025-04-14 RX ORDER — FUROSEMIDE 40 MG/1
40 TABLET ORAL
Qty: 60 TABLET | Refills: 1 | Status: SHIPPED | OUTPATIENT
Start: 2025-04-14

## 2025-04-15 ENCOUNTER — TELEPHONE (OUTPATIENT)
Dept: PRIMARY CARE | Facility: CLINIC | Age: 55
End: 2025-04-15
Payer: COMMERCIAL

## 2025-04-15 NOTE — TELEPHONE ENCOUNTER
Pt would like to know if you can recommend a rehabilitation center for leg swelling and back. Wants anywhere but [place]. Legs are getting worse.

## 2025-04-24 ENCOUNTER — NURSING HOME VISIT (OUTPATIENT)
Dept: POST ACUTE CARE | Facility: EXTERNAL LOCATION | Age: 55
End: 2025-04-24
Payer: COMMERCIAL

## 2025-04-24 DIAGNOSIS — E66.01 MORBID OBESITY (MULTI): ICD-10-CM

## 2025-04-24 DIAGNOSIS — L03.119 CELLULITIS OF LOWER EXTREMITY, UNSPECIFIED LATERALITY: ICD-10-CM

## 2025-04-24 DIAGNOSIS — D64.9 CHRONIC ANEMIA: ICD-10-CM

## 2025-04-24 DIAGNOSIS — N18.30 STAGE 3 CHRONIC KIDNEY DISEASE, UNSPECIFIED WHETHER STAGE 3A OR 3B CKD (MULTI): ICD-10-CM

## 2025-04-24 DIAGNOSIS — N17.9 AKI (ACUTE KIDNEY INJURY): Primary | ICD-10-CM

## 2025-04-24 PROCEDURE — 99305 1ST NF CARE MODERATE MDM 35: CPT | Performed by: EMERGENCY MEDICINE

## 2025-04-24 PROCEDURE — 99497 ADVNCD CARE PLAN 30 MIN: CPT | Performed by: EMERGENCY MEDICINE

## 2025-04-24 NOTE — LETTER
Patient: Li Allison  : 1970    Encounter Date: 2025    Li Allison   54 y.o.  female  @[email or web address]@            Assessment and Plan:  History and physical     54-year-old lady    ROB over CKD  Generalized weakness  Bilateral lower extremity cellulitis-recently treated  Chronic anemia  Morbid obesity      Patient's ROB significantly improved with creatinine being 1.7  Her cellulitis was recently treated and her legs are significant improved from before  Her anemia is chronic and she was recently evaluated by GI and no acute intervention was advised  Her hemoglobin is lower than yesterday-monitor-possibly dilutional  OT PT eval  Continue home meds  GI DVT prophylaxis.       54-year-old female admitted for evaluation of generalized weakness, ROB.  She has a history of morbid obesity, bilateral lower extremity cellulitis, chronic bilateral lower extremity edema, chronic anemia, CKD, hypertension.  She was seen by ID and started on IV vancomycin for bilateral lower extremity cellulitis.  ROB improved and cellulitis improved.  Pre-CERT was obtained to SNF and patient to be discharged to SNF today.  Antibiotic recommendations from ID pending still.    At time of discharge she is in stable and improved condition    Gomez catheter in place for retentin. Pt should undergo void trial at facility.     Medications (12) Active  Cymbalta 30 mg oral delayed release capsule 30 mg = 1 caps, ORAL, DAILY  doxycycline hyclate 100 mg oral capsule 100 mg = 1 caps, ORAL, BID  FeroSul 325 mg (65 mg elemental iron) oral tablet 325 mg = 1 tabs, ORAL, DAILY  Lyrica 300 mg oral capsule 300 mg = 1 caps, ORAL, BID  nystatin 100,000 units/g topical powder 1 corey, Topical, BID  Potassium 50 mg 1 tabs, ORAL, DAILY  predniSONE 10 mg oral tablet 10 mg = 1 tabs, ORAL, DAILY  ProAir HFA 90 mcg/inh inhalation aerosol 2 puffs, PRN, Inhalation, [id number]  spironolactone 100 mg oral tablet 100 mg = 1 tabs, ORAL, DAILY  torsemide 40 mg oral  tablet 80 mg = 2 tabs, ORAL, DAILY  traMADol 50 mg oral tablet 50 mg = 1 tabs, ORAL, [id number]  traZODone 100 mg oral tablet 200 mg = 2 tabs, ORAL, QHS  :  (Complete Discharge Med Rec prior to selecting ST).        Source of history: Nurse, Medical personnel, Medical record, Patient.  History limitation: None.    HPI:  History and physical    Patient is unable to give any detailed history and therefore history is obtained from the chart  No acute complaints voiced by the patient or acute concerns raised by nursing    History of hospitalization-    This is a 54-year-old lady with past history of morbid obesity bilateral lower extremity cellulitis and lower extremity edema, chronic anemia, CKD, hypertension  Patient was recently admitted to the hospital for cellulitis and swelling and was discharged home on diuretics  She reported to the emergency room for generalized weakness  She was diagnosis ROB generalized weakness and admitted for further care      Problem List/Past Medical History Ongoing Abnormal weight gain Acquired spondylolisthesis Acute back pain with sciatica Acute sinusitis Acute urinary tract infection Anxiety Arthritis of right hip Atypical ductal hyperplasia of left breast Breast lump Dyspnea on exertion Essential hypertension Hip pain Iron deficiency anemia Left ventricular hypertrophy Low back pain Lumbosacral spondylosis with radiculopathy Mammography abnormal Pain in right hip joint Pain in right lower limb Pain of right knee region Problem related to primary support group, unspecified Swelling of lower limb Historical Depression Procedure/Surgical History   esophagogastroduodenoscopy(EGD). (2025)   Colonoscopy. with HET (2018)   Biopsy Breast Core Ultrasound Guided. (2017)   ear surgery ()   Tubal Ligation    Section x2    Allergies No Known Medication Allergies Social History   Alcohol - Denies Alcohol Use   Sexual Other contraceptive use:Tubal Ligation   Substance  Abuse - Denies Substance Abuse   Tobacco - Denies Tobacco Use Use:Never (less than 100 in lifetime) Family History Breast cancer....: Mother.  Current Medications[1]    Physical Exam:  Vital signs as per nursing/MA documentation were reviewed  General appearance: Alert and in no acute distress  HEENT: Normal Inspection  Neck - Normal Inspection  Respiratory : No respiratory distress. Lungs are clear   Cardiovascular: heart rate normal. No gallop  Back - normal inspection  Skin inspection:Warm  Musculoskeletal : No deformities  Neuro : Limited exam. Baseline    ROS: Review of symptoms is negative except for what is mentioned in HPI    Results/Data  Records including but not limited to electronic medical records, relevant lab work and imaging from patient's health care facility encounter were reviewed and independently verified      Charting was completed using voice recognition technology and may include unintended errors.    Discussed with patient/family, RN, and NP.      Electronically Signed By: Dano Rader MD   4/26/25  8:25 AM       [1]  Current Outpatient Medications   Medication Sig Dispense Refill   • albuterol 90 mcg/actuation inhaler INHALE 2 PUFFS EVERY 4 HOURS IF NEEDED FOR WHEEZING OR SHORTNESS OF BREATH. 18 g 5   • budesonide-glycopyr-formoterol (Breztri Aerosphere) 160-9-4.8 mcg/actuation HFA aerosol inhaler Inhale 2 puffs 2 times a day. 10.7 g 2   • bumetanide (Bumex) 2 mg tablet Take 0.5 tablets (1 mg) by mouth once daily. 30 tablet 2   • cyclobenzaprine (Flexeril) 10 mg tablet TAKE 1 TABLET (10 MG) BY MOUTH 3 TIMES A DAY AS NEEDED FOR MUSCLE SPASMS FOR UP TO 10 DAYS. 30 tablet 0   • DULoxetine (Cymbalta) 30 mg DR capsule TAKE 1 CAPSULE (30 MG) BY MOUTH ONCE DAILY. DO NOT CRUSH OR CHEW. 90 capsule 1   • fluticasone (Flonase) 50 mcg/actuation nasal spray Administer 2 sprays into each nostril once daily.     • furosemide (Lasix) 20 mg tablet TAKE 1 TABLET BY MOUTH EVERY DAY (Patient not taking:  Reported on 4/4/2025) 90 tablet 1   • furosemide (Lasix) 40 mg tablet TAKE 1 TABLET (40 MG) BY MOUTH 2 TIMES DAILY (MORNING AND LATE AFTERNOON). 60 tablet 1   • gabapentin (Neurontin) 100 mg capsule TAKE 1 CAPSULE BY MOUTH THREE TIMES A DAY (Patient not taking: Reported on 4/4/2025) 90 capsule 2   • Lasix 40 mg tablet Take 1 tablet (40 mg) by mouth once daily for 7 days. 7 tablet 0   • losartan (Cozaar) 50 mg tablet TAKE 1 TABLET BY MOUTH EVERY DAY 90 tablet 1   • meloxicam (Mobic) 15 mg tablet TAKE 1 TABLET BY MOUTH EVERY DAY 90 tablet 3   • potassium (POTASSIMIN ORAL) Take 50 mg by mouth.     • predniSONE (Deltasone) 10 mg tablet 6 TABS X 2 DAYS, 5 TABS X 2 DAYS, 4 TABS X 2 DAYS, 3 TABS X 2 DAYS, 2 TABS X 7 DAYS, 1 TAB X 2 WEEKS (Patient not taking: Reported on 4/4/2025) 64 tablet 0   • pregabalin (Lyrica) 300 mg capsule Take 1 capsule (300 mg) by mouth every 12 hours.     • spironolactone (Aldactone) 100 mg tablet Take 1 tablet (100 mg) by mouth once daily as needed (swelling). (Patient not taking: Reported on 4/4/2025) 90 tablet 1   • spironolactone (Aldactone) 100 mg tablet Take 1 tablet (100 mg) by mouth once daily for 15 days. 15 tablet 0   • spironolactone (Aldactone) 50 mg tablet TAKE 2 TABLETS (100 MG) BY MOUTH ONCE DAILY. ONCE THE SWELLING GOES DOWN, CUT THE DOSE TO HALF (Patient not taking: Reported on 4/4/2025) 60 tablet 1   • spironolactone (Aldactone) 50 mg tablet Take 1 tablet (50 mg) by mouth once daily. Adjust medication based on swelling 30 tablet 1   • torsemide (Demadex) 20 mg tablet TAKE 2 TABLETS (40 MG) BY MOUTH ONCE DAILY. ONCE THE SWELLING IS DOWN, CUT THE DOSE TO HALF (Patient not taking: Reported on 4/4/2025) 60 tablet 2   • traMADol (Ultram) 50 mg tablet Take 1 tablet (50 mg) by mouth every 12 hours. Take 1 to 2 tablets     • traZODone (Desyrel) 100 mg tablet TAKE 2 TABLETS (200 MG) BY MOUTH AS NEEDED AT BEDTIME FOR SLEEP. (Patient not taking: Reported on 4/4/2025) 180 tablet 1     No  current facility-administered medications for this visit.

## 2025-04-26 PROBLEM — E66.01 MORBID OBESITY (MULTI): Status: ACTIVE | Noted: 2025-04-26

## 2025-04-26 PROBLEM — D64.9 CHRONIC ANEMIA: Status: ACTIVE | Noted: 2025-04-26

## 2025-04-26 PROBLEM — N17.9 AKI (ACUTE KIDNEY INJURY): Status: ACTIVE | Noted: 2025-04-26

## 2025-04-26 PROBLEM — N18.30 STAGE 3 CHRONIC KIDNEY DISEASE (MULTI): Status: ACTIVE | Noted: 2025-04-26

## 2025-04-26 PROBLEM — L03.119 CELLULITIS OF LOWER EXTREMITY: Status: ACTIVE | Noted: 2025-04-26

## 2025-04-26 NOTE — PROGRESS NOTES
Li Allison   54 y.o.  female  @location@            Assessment and Plan:  History and physical     54-year-old lady    ROB over CKD  Generalized weakness  Bilateral lower extremity cellulitis-recently treated  Chronic anemia  Morbid obesity      Patient's ROB significantly improved with creatinine being 1.7  Her cellulitis was recently treated and her legs are significant improved from before  Her anemia is chronic and she was recently evaluated by GI and no acute intervention was advised  Her hemoglobin is lower than yesterday-monitor-possibly dilutional  OT PT eval  Continue home meds  GI DVT prophylaxis.       54-year-old female admitted for evaluation of generalized weakness, ROB.  She has a history of morbid obesity, bilateral lower extremity cellulitis, chronic bilateral lower extremity edema, chronic anemia, CKD, hypertension.  She was seen by ID and started on IV vancomycin for bilateral lower extremity cellulitis.  ROB improved and cellulitis improved.  Pre-CERT was obtained to SNF and patient to be discharged to SNF today.  Antibiotic recommendations from ID pending still.    At time of discharge she is in stable and improved condition    Gomez catheter in place for retentin. Pt should undergo void trial at facility.     Medications (12) Active  Cymbalta 30 mg oral delayed release capsule 30 mg = 1 caps, ORAL, DAILY  doxycycline hyclate 100 mg oral capsule 100 mg = 1 caps, ORAL, BID  FeroSul 325 mg (65 mg elemental iron) oral tablet 325 mg = 1 tabs, ORAL, DAILY  Lyrica 300 mg oral capsule 300 mg = 1 caps, ORAL, BID  nystatin 100,000 units/g topical powder 1 corey, Topical, BID  Potassium 50 mg 1 tabs, ORAL, DAILY  predniSONE 10 mg oral tablet 10 mg = 1 tabs, ORAL, DAILY  ProAir HFA 90 mcg/inh inhalation aerosol 2 puffs, PRN, Inhalation, [id number]  spironolactone 100 mg oral tablet 100 mg = 1 tabs, ORAL, DAILY  torsemide 40 mg oral tablet 80 mg = 2 tabs, ORAL, DAILY  traMADol 50 mg oral tablet 50 mg = 1  tabs, ORAL, [id number]  traZODone 100 mg oral tablet 200 mg = 2 tabs, ORAL, QHS  :  (Complete Discharge Med Rec prior to selecting ST).        Source of history: Nurse, Medical personnel, Medical record, Patient.  History limitation: None.    HPI:  History and physical    Patient is unable to give any detailed history and therefore history is obtained from the chart  No acute complaints voiced by the patient or acute concerns raised by nursing    History of hospitalization-    This is a 54-year-old lady with past history of morbid obesity bilateral lower extremity cellulitis and lower extremity edema, chronic anemia, CKD, hypertension  Patient was recently admitted to the hospital for cellulitis and swelling and was discharged home on diuretics  She reported to the emergency room for generalized weakness  She was diagnosis ROB generalized weakness and admitted for further care      Problem List/Past Medical History Ongoing Abnormal weight gain Acquired spondylolisthesis Acute back pain with sciatica Acute sinusitis Acute urinary tract infection Anxiety Arthritis of right hip Atypical ductal hyperplasia of left breast Breast lump Dyspnea on exertion Essential hypertension Hip pain Iron deficiency anemia Left ventricular hypertrophy Low back pain Lumbosacral spondylosis with radiculopathy Mammography abnormal Pain in right hip joint Pain in right lower limb Pain of right knee region Problem related to primary support group, unspecified Swelling of lower limb Historical Depression Procedure/Surgical History   esophagogastroduodenoscopy(EGD). (2025)   Colonoscopy. with HET (2018)   Biopsy Breast Core Ultrasound Guided. (2017)   ear surgery ()   Tubal Ligation    Section x2    Allergies No Known Medication Allergies Social History   Alcohol - Denies Alcohol Use   Sexual Other contraceptive use:Tubal Ligation   Substance Abuse - Denies Substance Abuse   Tobacco - Denies Tobacco Use Use:Never  (less than 100 in lifetime) Family History Breast cancer....: Mother.  Current Medications[1]    Physical Exam:  Vital signs as per nursing/MA documentation were reviewed  General appearance: Alert and in no acute distress  HEENT: Normal Inspection  Neck - Normal Inspection  Respiratory : No respiratory distress. Lungs are clear   Cardiovascular: heart rate normal. No gallop  Back - normal inspection  Skin inspection:Warm  Musculoskeletal : No deformities  Neuro : Limited exam. Baseline    ROS: Review of symptoms is negative except for what is mentioned in HPI    Results/Data  Records including but not limited to electronic medical records, relevant lab work and imaging from patient's health care facility encounter were reviewed and independently verified      Charting was completed using voice recognition technology and may include unintended errors.    Discussed with patient/family, RN, and NP.       [1]   Current Outpatient Medications   Medication Sig Dispense Refill    albuterol 90 mcg/actuation inhaler INHALE 2 PUFFS EVERY 4 HOURS IF NEEDED FOR WHEEZING OR SHORTNESS OF BREATH. 18 g 5    budesonide-glycopyr-formoterol (Breztri Aerosphere) 160-9-4.8 mcg/actuation HFA aerosol inhaler Inhale 2 puffs 2 times a day. 10.7 g 2    bumetanide (Bumex) 2 mg tablet Take 0.5 tablets (1 mg) by mouth once daily. 30 tablet 2    cyclobenzaprine (Flexeril) 10 mg tablet TAKE 1 TABLET (10 MG) BY MOUTH 3 TIMES A DAY AS NEEDED FOR MUSCLE SPASMS FOR UP TO 10 DAYS. 30 tablet 0    DULoxetine (Cymbalta) 30 mg DR capsule TAKE 1 CAPSULE (30 MG) BY MOUTH ONCE DAILY. DO NOT CRUSH OR CHEW. 90 capsule 1    fluticasone (Flonase) 50 mcg/actuation nasal spray Administer 2 sprays into each nostril once daily.      furosemide (Lasix) 20 mg tablet TAKE 1 TABLET BY MOUTH EVERY DAY (Patient not taking: Reported on 4/4/2025) 90 tablet 1    furosemide (Lasix) 40 mg tablet TAKE 1 TABLET (40 MG) BY MOUTH 2 TIMES DAILY (MORNING AND LATE AFTERNOON). 60  tablet 1    gabapentin (Neurontin) 100 mg capsule TAKE 1 CAPSULE BY MOUTH THREE TIMES A DAY (Patient not taking: Reported on 4/4/2025) 90 capsule 2    Lasix 40 mg tablet Take 1 tablet (40 mg) by mouth once daily for 7 days. 7 tablet 0    losartan (Cozaar) 50 mg tablet TAKE 1 TABLET BY MOUTH EVERY DAY 90 tablet 1    meloxicam (Mobic) 15 mg tablet TAKE 1 TABLET BY MOUTH EVERY DAY 90 tablet 3    potassium (POTASSIMIN ORAL) Take 50 mg by mouth.      predniSONE (Deltasone) 10 mg tablet 6 TABS X 2 DAYS, 5 TABS X 2 DAYS, 4 TABS X 2 DAYS, 3 TABS X 2 DAYS, 2 TABS X 7 DAYS, 1 TAB X 2 WEEKS (Patient not taking: Reported on 4/4/2025) 64 tablet 0    pregabalin (Lyrica) 300 mg capsule Take 1 capsule (300 mg) by mouth every 12 hours.      spironolactone (Aldactone) 100 mg tablet Take 1 tablet (100 mg) by mouth once daily as needed (swelling). (Patient not taking: Reported on 4/4/2025) 90 tablet 1    spironolactone (Aldactone) 100 mg tablet Take 1 tablet (100 mg) by mouth once daily for 15 days. 15 tablet 0    spironolactone (Aldactone) 50 mg tablet TAKE 2 TABLETS (100 MG) BY MOUTH ONCE DAILY. ONCE THE SWELLING GOES DOWN, CUT THE DOSE TO HALF (Patient not taking: Reported on 4/4/2025) 60 tablet 1    spironolactone (Aldactone) 50 mg tablet Take 1 tablet (50 mg) by mouth once daily. Adjust medication based on swelling 30 tablet 1    torsemide (Demadex) 20 mg tablet TAKE 2 TABLETS (40 MG) BY MOUTH ONCE DAILY. ONCE THE SWELLING IS DOWN, CUT THE DOSE TO HALF (Patient not taking: Reported on 4/4/2025) 60 tablet 2    traMADol (Ultram) 50 mg tablet Take 1 tablet (50 mg) by mouth every 12 hours. Take 1 to 2 tablets      traZODone (Desyrel) 100 mg tablet TAKE 2 TABLETS (200 MG) BY MOUTH AS NEEDED AT BEDTIME FOR SLEEP. (Patient not taking: Reported on 4/4/2025) 180 tablet 1     No current facility-administered medications for this visit.

## 2025-04-29 ENCOUNTER — TELEPHONE (OUTPATIENT)
Dept: PRIMARY CARE | Facility: CLINIC | Age: 55
End: 2025-04-29

## 2025-04-29 NOTE — TELEPHONE ENCOUNTER
AT [facility]. DR GAONA WAS THERE FEW DAYS AGO. GOING TO PAIN MANAGEMENT ON FRIDAY. CAN ORDER BE SENT TO ??     NEEDS LAB ORDER SENT TO . I DID LET PT KNOW THAT THE LAB ORDER AT [facility] NEEDS TO FAX TO . SHE STATES THEY HAVE BEEN AROUND A FEW TIMES/DECLINED/WANTS TO DO AT .  STILL HAVING NUMBNESS/TINGLING LEFT ARM.   WHY IS THERE NO MRI ORDER FOR BACK?     DID NOT ADDRESS ANY OF THE ABOVE ISSUES WHEN HE WAS THERE COUPLE DAYS AGO BECAUSE SHE FORGETS TO.

## 2025-05-01 ENCOUNTER — TELEPHONE (OUTPATIENT)
Dept: PRIMARY CARE | Facility: CLINIC | Age: 55
End: 2025-05-01

## 2025-05-01 DIAGNOSIS — M43.17 ACQUIRED SPONDYLOLISTHESIS OF LUMBOSACRAL REGION: ICD-10-CM

## 2025-05-01 DIAGNOSIS — M54.16 LUMBAR RADICULOPATHY: ICD-10-CM

## 2025-05-01 DIAGNOSIS — M47.27 LUMBOSACRAL SPONDYLOSIS WITH RADICULOPATHY: ICD-10-CM

## 2025-05-02 RX ORDER — PREDNISONE 10 MG/1
TABLET ORAL
Qty: 64 TABLET | Refills: 0 | OUTPATIENT
Start: 2025-05-02

## 2025-05-05 NOTE — TELEPHONE ENCOUNTER
"Patient is at [facility], wanted to know about the MRI of her back she is supposed to have. I let patient know to speak to the nurse about this and they can talk to the nurse practitioner about the MRI. Patient stated she is still experiencing tingling and numbness in left arm. I asked patient if she is having any chest pain, she said a little. I told patient to let the nurse know and she refused to because they would do a bunch of tests on her. Again, I let the patient know that this could be serious and she should really tell the nurse. Then patient said \"No, I am not having any chest pains and to leave it at that.\"   "

## 2025-05-15 ENCOUNTER — APPOINTMENT (OUTPATIENT)
Dept: PRIMARY CARE | Facility: CLINIC | Age: 55
End: 2025-05-15
Payer: COMMERCIAL

## 2025-05-15 NOTE — TELEPHONE ENCOUNTER
Pt is at [facility] and is requesting oxycodone   I told her it would have to probably be taken care of at the nursing home but still wants a message sent.

## 2025-05-20 ENCOUNTER — TELEPHONE (OUTPATIENT)
Dept: PRIMARY CARE | Facility: CLINIC | Age: 55
End: 2025-05-20

## 2025-05-20 ENCOUNTER — NURSING HOME VISIT (OUTPATIENT)
Dept: POST ACUTE CARE | Facility: EXTERNAL LOCATION | Age: 55
End: 2025-05-20
Payer: COMMERCIAL

## 2025-05-20 DIAGNOSIS — D64.9 CHRONIC ANEMIA: ICD-10-CM

## 2025-05-20 DIAGNOSIS — S72.002D CLOSED FRACTURE OF LEFT HIP WITH ROUTINE HEALING, SUBSEQUENT ENCOUNTER: Primary | ICD-10-CM

## 2025-05-20 DIAGNOSIS — N18.30 STAGE 3 CHRONIC KIDNEY DISEASE, UNSPECIFIED WHETHER STAGE 3A OR 3B CKD (MULTI): ICD-10-CM

## 2025-05-20 DIAGNOSIS — E66.01 MORBID OBESITY (MULTI): ICD-10-CM

## 2025-05-20 DIAGNOSIS — I10 PRIMARY HYPERTENSION: ICD-10-CM

## 2025-05-20 PROCEDURE — 99305 1ST NF CARE MODERATE MDM 35: CPT | Performed by: EMERGENCY MEDICINE

## 2025-05-20 NOTE — TELEPHONE ENCOUNTER
PT is getting her MRI for her back.    PT is asking for several valiums to relax her before MRI    MRI is scheduled June 3rd    [facility]

## 2025-05-20 NOTE — PROGRESS NOTES
Li Allison   54 y.o.  female  @location@            Assessment and Plan:  History and physical        Left hip fracture  anemia  Mild cervical spine arthropathy  Hypertension  Morbid obesity      Patient underwent left hip hemiarthroplasty  50% weightbearing  Patient to use walker for 3 weeks  Gomez catheter to be pulled out today  Pain control  Patient on antibiotics for prophylaxis  Patient does not want SCDs  Patient also declined lab draws  OT PT eval  Plan for patient to be discharged back to [facility]    check for blood in stool  iron studies    lovenox     protonix    Discontinue daily labs     -Fall prevention    -Cognitive engagement     -Monitor and treat blood pressure     -Aggressive decubitus ulcer prevention.     -Bowel and bladder care     -Optimal nutrition and supplementation as needed     -GI  and DVT prophylaxis     -Symptom control     -Ambulation as tolerated     -Will follow    Charting is done using voice recognition software and may contain errors which have not been completely corrected    Source of history: Nurse, Medical personnel, Medical record, Patient.  History limitation: None.    HPI:  History and physical    Patient is unable to give any detailed history and therefore history is obtained from the chart  No acute complaints voiced by the patient or acute concerns raised by nursing    History of hospitalization-  History of Present Illness   54-year-old female with CKD, hypertension, anemia, morbid obesity comes to the hospital with the inability to ambulate.  Patient was recently admitted in April for bilateral lower extremity cellulitis and ROB and generalized weakness.  She then was sent to a SNF.  Patient apparently went to see outpatient orthopedist consult for difficulty with walking and was found to have a subacute femoral neck fracture on the left.  Patient was transferred back to [facility] and is now here for a left hip implant.  Prior to surgeon taking patient back for  surgery, patient is to have evaluation of her cervical spine with an MRI.  Patient complains of some numbness in her left lateral fingers but otherwise has normal strength normal symmetric muscles bilaterally.  It is unclear how long patient has had a hip fracture     Problem List/Past Medical History Ongoing Abnormal weight gain Acquired spondylolisthesis Acute back pain with sciatica Acute sinusitis Acute urinary tract infection Anxiety Arthritis of right hip Atypical ductal hyperplasia of left breast Bacterial infectious disease Breast lump Cellulitis Dyspnea on exertion Edema Essential hypertension Gastroesophageal reflux disease Hip pain Insomnia Iron deficiency anemia Left ventricular hypertrophy Low back pain Lumbosacral spondylosis with radiculopathy Mammography abnormal Needs influenza immunization Pain in right hip joint Pain in right lower limb Pain of right knee region Patient encounter status Peripheral edemaProblem related to primary support group, unspecified Swelling of lower limb Historical Depression Procedure/Surgical History   esophagogastroduodenoscopy(EGD). (2025)   Colonoscopy. with HET (2018)   Biopsy Breast Core Ultrasound Guided. (2017)   ear surgery ()   Tubal Ligation    Section x2    Allergies No Known Medication Allergies Social History   Alcohol - Denies Alcohol Use   Home/Environment Lives with:Children, Spouse *Living Situation Prior to AdmissionHome with assistance Home equipment:Walker/Cane Monitoring Equipment in homeNone *Special Services and Community Resources Prior to AdmissionNone *Mobility Assistance Prior to AdmissionPartial assistance *Will patient require additional/new services upon discharge?No   Sexual Other contraceptive use:Tubal Ligation   Substance Abuse - Denies Substance Abuse   Tobacco - Denies Tobacco Use Use:Never (less than 100 in lifetime) Family History Breast cancer....: Mother.     Current Medications[1]    Physical  Exam:  Vital signs as per nursing/MA documentation were reviewed  General appearance: Alert and in no acute distress  HEENT: Normal Inspection  Neck - Normal Inspection  Respiratory : No respiratory distress. Lungs are clear   Cardiovascular: heart rate normal. No gallop  Back - normal inspection  Skin inspection:Warm  Musculoskeletal : No deformities  Neuro : Limited exam. Baseline    ROS: Review of symptoms is negative except for what is mentioned in HPI    Results/Data  Records including but not limited to electronic medical records, relevant lab work and imaging from patient's health care facility encounter were reviewed and independently verified      Charting was completed using voice recognition technology and may include unintended errors.    Discussed with patient/family, RN, and NP.       [1]   Current Outpatient Medications   Medication Sig Dispense Refill    albuterol 90 mcg/actuation inhaler INHALE 2 PUFFS EVERY 4 HOURS IF NEEDED FOR WHEEZING OR SHORTNESS OF BREATH. 18 g 5    budesonide-glycopyr-formoterol (Breztri Aerosphere) 160-9-4.8 mcg/actuation HFA aerosol inhaler Inhale 2 puffs 2 times a day. 10.7 g 2    bumetanide (Bumex) 2 mg tablet Take 0.5 tablets (1 mg) by mouth once daily. 30 tablet 2    cyclobenzaprine (Flexeril) 10 mg tablet TAKE 1 TABLET (10 MG) BY MOUTH 3 TIMES A DAY AS NEEDED FOR MUSCLE SPASMS FOR UP TO 10 DAYS. 30 tablet 0    DULoxetine (Cymbalta) 30 mg DR capsule TAKE 1 CAPSULE (30 MG) BY MOUTH ONCE DAILY. DO NOT CRUSH OR CHEW. 90 capsule 1    fluticasone (Flonase) 50 mcg/actuation nasal spray Administer 2 sprays into each nostril once daily.      furosemide (Lasix) 20 mg tablet TAKE 1 TABLET BY MOUTH EVERY DAY (Patient not taking: Reported on 4/4/2025) 90 tablet 1    furosemide (Lasix) 40 mg tablet TAKE 1 TABLET (40 MG) BY MOUTH 2 TIMES DAILY (MORNING AND LATE AFTERNOON). 60 tablet 1    gabapentin (Neurontin) 100 mg capsule TAKE 1 CAPSULE BY MOUTH THREE TIMES A DAY (Patient not  taking: Reported on 4/4/2025) 90 capsule 2    Lasix 40 mg tablet Take 1 tablet (40 mg) by mouth once daily for 7 days. 7 tablet 0    losartan (Cozaar) 50 mg tablet TAKE 1 TABLET BY MOUTH EVERY DAY 90 tablet 1    meloxicam (Mobic) 15 mg tablet TAKE 1 TABLET BY MOUTH EVERY DAY 90 tablet 3    potassium (POTASSIMIN ORAL) Take 50 mg by mouth.      predniSONE (Deltasone) 10 mg tablet 6 TABS X 2 DAYS, 5 TABS X 2 DAYS, 4 TABS X 2 DAYS, 3 TABS X 2 DAYS, 2 TABS X 7 DAYS, 1 TAB X 2 WEEKS (Patient not taking: Reported on 4/4/2025) 64 tablet 0    pregabalin (Lyrica) 300 mg capsule Take 1 capsule (300 mg) by mouth every 12 hours.      spironolactone (Aldactone) 100 mg tablet Take 1 tablet (100 mg) by mouth once daily as needed (swelling). (Patient not taking: Reported on 4/4/2025) 90 tablet 1    spironolactone (Aldactone) 100 mg tablet Take 1 tablet (100 mg) by mouth once daily for 15 days. 15 tablet 0    spironolactone (Aldactone) 50 mg tablet TAKE 2 TABLETS (100 MG) BY MOUTH ONCE DAILY. ONCE THE SWELLING GOES DOWN, CUT THE DOSE TO HALF (Patient not taking: Reported on 4/4/2025) 60 tablet 1    spironolactone (Aldactone) 50 mg tablet Take 1 tablet (50 mg) by mouth once daily. Adjust medication based on swelling 30 tablet 1    torsemide (Demadex) 20 mg tablet TAKE 2 TABLETS (40 MG) BY MOUTH ONCE DAILY. ONCE THE SWELLING IS DOWN, CUT THE DOSE TO HALF (Patient not taking: Reported on 4/4/2025) 60 tablet 2    traMADol (Ultram) 50 mg tablet Take 1 tablet (50 mg) by mouth every 12 hours. Take 1 to 2 tablets      traZODone (Desyrel) 100 mg tablet TAKE 2 TABLETS (200 MG) BY MOUTH AS NEEDED AT BEDTIME FOR SLEEP. (Patient not taking: Reported on 4/4/2025) 180 tablet 1     No current facility-administered medications for this visit.

## 2025-05-20 NOTE — LETTER
Patient: Li Allison  : 1970    Encounter Date: 2025    Li Allison   54 y.o.  female  @location@            Assessment and Plan:  History and physical        Left hip fracture  anemia  Mild cervical spine arthropathy  Hypertension  Morbid obesity      Patient underwent left hip hemiarthroplasty  50% weightbearing  Patient to use walker for 3 weeks  Gomez catheter to be pulled out today  Pain control  Patient on antibiotics for prophylaxis  Patient does not want SCDs  Patient also declined lab draws  OT PT eval  Plan for patient to be discharged back to [facility]    check for blood in stool  iron studies    lovenox     protonix    Discontinue daily labs     -Fall prevention    -Cognitive engagement     -Monitor and treat blood pressure     -Aggressive decubitus ulcer prevention.     -Bowel and bladder care     -Optimal nutrition and supplementation as needed     -GI  and DVT prophylaxis     -Symptom control     -Ambulation as tolerated     -Will follow    Charting is done using voice recognition software and may contain errors which have not been completely corrected    Source of history: Nurse, Medical personnel, Medical record, Patient.  History limitation: None.    HPI:  History and physical    Patient is unable to give any detailed history and therefore history is obtained from the chart  No acute complaints voiced by the patient or acute concerns raised by nursing    History of hospitalization-  History of Present Illness   54-year-old female with CKD, hypertension, anemia, morbid obesity comes to the hospital with the inability to ambulate.  Patient was recently admitted in April for bilateral lower extremity cellulitis and ROB and generalized weakness.  She then was sent to a SNF.  Patient apparently went to see outpatient orthopedist consult for difficulty with walking and was found to have a subacute femoral neck fracture on the left.  Patient was transferred back to [facility] and is now  here for a left hip implant.  Prior to surgeon taking patient back for surgery, patient is to have evaluation of her cervical spine with an MRI.  Patient complains of some numbness in her left lateral fingers but otherwise has normal strength normal symmetric muscles bilaterally.  It is unclear how long patient has had a hip fracture     Problem List/Past Medical History Ongoing Abnormal weight gain Acquired spondylolisthesis Acute back pain with sciatica Acute sinusitis Acute urinary tract infection Anxiety Arthritis of right hip Atypical ductal hyperplasia of left breast Bacterial infectious disease Breast lump Cellulitis Dyspnea on exertion Edema Essential hypertension Gastroesophageal reflux disease Hip pain Insomnia Iron deficiency anemia Left ventricular hypertrophy Low back pain Lumbosacral spondylosis with radiculopathy Mammography abnormal Needs influenza immunization Pain in right hip joint Pain in right lower limb Pain of right knee region Patient encounter status Peripheral edemaProblem related to primary support group, unspecified Swelling of lower limb Historical Depression Procedure/Surgical History   esophagogastroduodenoscopy(EGD). (2025)   Colonoscopy. with HET (2018)   Biopsy Breast Core Ultrasound Guided. (2017)   ear surgery ()   Tubal Ligation    Section x2    Allergies No Known Medication Allergies Social History   Alcohol - Denies Alcohol Use   Home/Environment Lives with:Children, Spouse *Living Situation Prior to AdmissionHome with assistance Home equipment:Walker/Cane Monitoring Equipment in homeNone *Special Services and Community Resources Prior to AdmissionNone *Mobility Assistance Prior to AdmissionPartial assistance *Will patient require additional/new services upon discharge?No   Sexual Other contraceptive use:Tubal Ligation   Substance Abuse - Denies Substance Abuse   Tobacco - Denies Tobacco Use Use:Never (less than 100 in lifetime) Family History  Breast cancer....: Mother.     Current Medications[1]    Physical Exam:  Vital signs as per nursing/MA documentation were reviewed  General appearance: Alert and in no acute distress  HEENT: Normal Inspection  Neck - Normal Inspection  Respiratory : No respiratory distress. Lungs are clear   Cardiovascular: heart rate normal. No gallop  Back - normal inspection  Skin inspection:Warm  Musculoskeletal : No deformities  Neuro : Limited exam. Baseline    ROS: Review of symptoms is negative except for what is mentioned in HPI    Results/Data  Records including but not limited to electronic medical records, relevant lab work and imaging from patient's health care facility encounter were reviewed and independently verified      Charting was completed using voice recognition technology and may include unintended errors.    Discussed with patient/family, RN, and NP.      Electronically Signed By: Dano Rader MD   5/20/25  4:13 PM       [1]  Current Outpatient Medications   Medication Sig Dispense Refill   • albuterol 90 mcg/actuation inhaler INHALE 2 PUFFS EVERY 4 HOURS IF NEEDED FOR WHEEZING OR SHORTNESS OF BREATH. 18 g 5   • budesonide-glycopyr-formoterol (Breztri Aerosphere) 160-9-4.8 mcg/actuation HFA aerosol inhaler Inhale 2 puffs 2 times a day. 10.7 g 2   • bumetanide (Bumex) 2 mg tablet Take 0.5 tablets (1 mg) by mouth once daily. 30 tablet 2   • cyclobenzaprine (Flexeril) 10 mg tablet TAKE 1 TABLET (10 MG) BY MOUTH 3 TIMES A DAY AS NEEDED FOR MUSCLE SPASMS FOR UP TO 10 DAYS. 30 tablet 0   • DULoxetine (Cymbalta) 30 mg DR capsule TAKE 1 CAPSULE (30 MG) BY MOUTH ONCE DAILY. DO NOT CRUSH OR CHEW. 90 capsule 1   • fluticasone (Flonase) 50 mcg/actuation nasal spray Administer 2 sprays into each nostril once daily.     • furosemide (Lasix) 20 mg tablet TAKE 1 TABLET BY MOUTH EVERY DAY (Patient not taking: Reported on 4/4/2025) 90 tablet 1   • furosemide (Lasix) 40 mg tablet TAKE 1 TABLET (40 MG) BY MOUTH 2 TIMES DAILY  (MORNING AND LATE AFTERNOON). 60 tablet 1   • gabapentin (Neurontin) 100 mg capsule TAKE 1 CAPSULE BY MOUTH THREE TIMES A DAY (Patient not taking: Reported on 4/4/2025) 90 capsule 2   • Lasix 40 mg tablet Take 1 tablet (40 mg) by mouth once daily for 7 days. 7 tablet 0   • losartan (Cozaar) 50 mg tablet TAKE 1 TABLET BY MOUTH EVERY DAY 90 tablet 1   • meloxicam (Mobic) 15 mg tablet TAKE 1 TABLET BY MOUTH EVERY DAY 90 tablet 3   • potassium (POTASSIMIN ORAL) Take 50 mg by mouth.     • predniSONE (Deltasone) 10 mg tablet 6 TABS X 2 DAYS, 5 TABS X 2 DAYS, 4 TABS X 2 DAYS, 3 TABS X 2 DAYS, 2 TABS X 7 DAYS, 1 TAB X 2 WEEKS (Patient not taking: Reported on 4/4/2025) 64 tablet 0   • pregabalin (Lyrica) 300 mg capsule Take 1 capsule (300 mg) by mouth every 12 hours.     • spironolactone (Aldactone) 100 mg tablet Take 1 tablet (100 mg) by mouth once daily as needed (swelling). (Patient not taking: Reported on 4/4/2025) 90 tablet 1   • spironolactone (Aldactone) 100 mg tablet Take 1 tablet (100 mg) by mouth once daily for 15 days. 15 tablet 0   • spironolactone (Aldactone) 50 mg tablet TAKE 2 TABLETS (100 MG) BY MOUTH ONCE DAILY. ONCE THE SWELLING GOES DOWN, CUT THE DOSE TO HALF (Patient not taking: Reported on 4/4/2025) 60 tablet 1   • spironolactone (Aldactone) 50 mg tablet Take 1 tablet (50 mg) by mouth once daily. Adjust medication based on swelling 30 tablet 1   • torsemide (Demadex) 20 mg tablet TAKE 2 TABLETS (40 MG) BY MOUTH ONCE DAILY. ONCE THE SWELLING IS DOWN, CUT THE DOSE TO HALF (Patient not taking: Reported on 4/4/2025) 60 tablet 2   • traMADol (Ultram) 50 mg tablet Take 1 tablet (50 mg) by mouth every 12 hours. Take 1 to 2 tablets     • traZODone (Desyrel) 100 mg tablet TAKE 2 TABLETS (200 MG) BY MOUTH AS NEEDED AT BEDTIME FOR SLEEP. (Patient not taking: Reported on 4/4/2025) 180 tablet 1     No current facility-administered medications for this visit.

## 2025-05-29 ENCOUNTER — TELEPHONE (OUTPATIENT)
Dept: PRIMARY CARE | Facility: CLINIC | Age: 55
End: 2025-05-29

## 2025-05-29 NOTE — TELEPHONE ENCOUNTER
HAVING AN MRI ON 6/3/25. WOULD LIKE SOMETHING SENT TO [facility] (WHERE SHE IS CURRENTLY) BECAUSE SHE IS CLAUSTROPHOBIC. ASKING FOR COUPLE VALIUM.

## 2025-06-09 ENCOUNTER — TELEPHONE (OUTPATIENT)
Dept: PRIMARY CARE | Facility: CLINIC | Age: 55
End: 2025-06-09

## 2025-06-11 ENCOUNTER — TELEMEDICINE (OUTPATIENT)
Dept: PRIMARY CARE | Facility: CLINIC | Age: 55
End: 2025-06-11
Payer: COMMERCIAL

## 2025-06-11 DIAGNOSIS — N18.30 STAGE 3 CHRONIC KIDNEY DISEASE, UNSPECIFIED WHETHER STAGE 3A OR 3B CKD (MULTI): ICD-10-CM

## 2025-06-11 DIAGNOSIS — N17.9 AKI (ACUTE KIDNEY INJURY): Primary | ICD-10-CM

## 2025-06-11 DIAGNOSIS — D50.9 IRON DEFICIENCY ANEMIA, UNSPECIFIED IRON DEFICIENCY ANEMIA TYPE: ICD-10-CM

## 2025-06-11 DIAGNOSIS — M54.16 LUMBAR RADICULOPATHY: ICD-10-CM

## 2025-06-11 DIAGNOSIS — F41.9 ANXIETY: ICD-10-CM

## 2025-06-11 PROCEDURE — 99213 OFFICE O/P EST LOW 20 MIN: CPT | Performed by: EMERGENCY MEDICINE

## 2025-06-11 NOTE — PROGRESS NOTES
Li Allison   54 y.o.  female  @location@            Assessment and Plan:      Left hip fracture  anemia  Mild cervical spine arthropathy  Hypertension  Morbid obesity      Left legs tingling and numbness-likely secondary to swelling.  Patient has swelling secondary to hip surgery.  Explained to her that this will take a while to go away.  She can use Ace bandage or compression stockings.      She is on diuretics.  She states that it is very difficult for her to manage diuretics and she has to urinate all the time.  Counseled her to talk to the staff at nursing home to cut down the dose of her diuretics      Patient underwent left hip hemiarthroplasty  50% weightbearing  Patient to use walker for 3 weeks  Gomez catheter to be pulled out today  Pain control  Patient on antibiotics for prophylaxis  Patient does not want SCDs  Patient also declined lab draws  OT PT eval  Plan for patient to be discharged back to [facility]    check for blood in stool  iron studies    lovenox     protonix    Discontinue daily labs     -Fall prevention    -Cognitive engagement     -Monitor and treat blood pressure     -Aggressive decubitus ulcer prevention.     -Bowel and bladder care     -Optimal nutrition and supplementation as needed     -GI  and DVT prophylaxis     -Symptom control     -Ambulation as tolerated     -Will follow    Charting is done using voice recognition software and may contain errors which have not been completely corrected    Source of history: Nurse, Medical personnel, Medical record, Patient.  History limitation: None.    HPI:   This visit was completed virtually due to the restrictions of the COVID-19 pandemic. All issues as below were discussed and addressed but no physical exam was performed. If it was felt that the patient should be evaluated in clinic or ER, then they were directed there. The patient verbally consented to visit      Patient is unable to give any detailed history and therefore history is  obtained from the chart  No acute complaints voiced by the patient or acute concerns raised by nursing    History of hospitalization-  History of Present Illness   54-year-old female with CKD, hypertension, anemia, morbid obesity comes to the hospital with the inability to ambulate.  Patient was recently admitted in April for bilateral lower extremity cellulitis and ROB and generalized weakness.  She then was sent to a SNF.  Patient apparently went to see outpatient orthopedist consult for difficulty with walking and was found to have a subacute femoral neck fracture on the left.  Patient was transferred back to [facility] and is now here for a left hip implant.  Prior to surgeon taking patient back for surgery, patient is to have evaluation of her cervical spine with an MRI.  Patient complains of some numbness in her left lateral fingers but otherwise has normal strength normal symmetric muscles bilaterally.  It is unclear how long patient has had a hip fracture     Problem List/Past Medical History Ongoing Abnormal weight gain Acquired spondylolisthesis Acute back pain with sciatica Acute sinusitis Acute urinary tract infection Anxiety Arthritis of right hip Atypical ductal hyperplasia of left breast Bacterial infectious disease Breast lump Cellulitis Dyspnea on exertion Edema Essential hypertension Gastroesophageal reflux disease Hip pain Insomnia Iron deficiency anemia Left ventricular hypertrophy Low back pain Lumbosacral spondylosis with radiculopathy Mammography abnormal Needs influenza immunization Pain in right hip joint Pain in right lower limb Pain of right knee region Patient encounter status Peripheral edemaProblem related to primary support group, unspecified Swelling of lower limb Historical Depression Procedure/Surgical History   esophagogastroduodenoscopy(EGD). (03/31/2025)   Colonoscopy. with HET (12/28/2018)   Biopsy Breast Core Ultrasound Guided. (09/28/2017)   ear surgery (1970)   Tubal Ligation     Section x2    Allergies No Known Medication Allergies Social History   Alcohol - Denies Alcohol Use   Home/Environment Lives with:Children, Spouse *Living Situation Prior to AdmissionHome with assistance Home equipment:Walker/Cane Monitoring Equipment in homeNone *Special Services and Community Resources Prior to AdmissionNone *Mobility Assistance Prior to AdmissionPartial assistance *Will patient require additional/new services upon discharge?No   Sexual Other contraceptive use:Tubal Ligation   Substance Abuse - Denies Substance Abuse   Tobacco - Denies Tobacco Use Use:Never (less than 100 in lifetime) Family History Breast cancer....: Mother.     Current Medications[1]    Physical Exam:  Vital signs as per nursing/MA documentation were reviewed  General appearance: Alert and in no acute distress  HEENT: Normal Inspection  Neck - Normal Inspection  Respiratory : No respiratory distress. Lungs are clear   Cardiovascular: heart rate normal. No gallop  Back - normal inspection  Skin inspection:Warm  Musculoskeletal : No deformities  Neuro : Limited exam. Baseline    ROS: Review of symptoms is negative except for what is mentioned in HPI    Results/Data  Records including but not limited to electronic medical records, relevant lab work and imaging from patient's health care facility encounter were reviewed and independently verified      Charting was completed using voice recognition technology and may include unintended errors.    Discussed with patient/family, RN, and NP.         [1]   Current Outpatient Medications   Medication Sig Dispense Refill    traMADol (Ultram) 50 mg tablet Take 1 tablet (50 mg) by mouth every 12 hours. Take 1 to 2 tablets      albuterol 90 mcg/actuation inhaler INHALE 2 PUFFS EVERY 4 HOURS IF NEEDED FOR WHEEZING OR SHORTNESS OF BREATH. 18 g 5    budesonide-glycopyr-formoterol (Breztri Aerosphere) 160-9-4.8 mcg/actuation HFA aerosol inhaler Inhale 2 puffs 2 times a day. 10.7 g 2     bumetanide (Bumex) 2 mg tablet Take 0.5 tablets (1 mg) by mouth once daily. 30 tablet 2    cyclobenzaprine (Flexeril) 10 mg tablet TAKE 1 TABLET (10 MG) BY MOUTH 3 TIMES A DAY AS NEEDED FOR MUSCLE SPASMS FOR UP TO 10 DAYS. 30 tablet 0    DULoxetine (Cymbalta) 30 mg DR capsule TAKE 1 CAPSULE (30 MG) BY MOUTH ONCE DAILY. DO NOT CRUSH OR CHEW. 90 capsule 1    fluticasone (Flonase) 50 mcg/actuation nasal spray Administer 2 sprays into each nostril once daily.      furosemide (Lasix) 20 mg tablet TAKE 1 TABLET BY MOUTH EVERY DAY (Patient not taking: Reported on 4/4/2025) 90 tablet 1    furosemide (Lasix) 40 mg tablet TAKE 1 TABLET (40 MG) BY MOUTH 2 TIMES DAILY (MORNING AND LATE AFTERNOON). 60 tablet 1    gabapentin (Neurontin) 100 mg capsule TAKE 1 CAPSULE BY MOUTH THREE TIMES A DAY 90 capsule 2    Lasix 40 mg tablet Take 1 tablet (40 mg) by mouth once daily for 7 days. 7 tablet 0    losartan (Cozaar) 50 mg tablet TAKE 1 TABLET BY MOUTH EVERY DAY 90 tablet 1    meloxicam (Mobic) 15 mg tablet TAKE 1 TABLET BY MOUTH EVERY DAY 90 tablet 3    potassium (POTASSIMIN ORAL) Take 50 mg by mouth.      predniSONE (Deltasone) 10 mg tablet 6 TABS X 2 DAYS, 5 TABS X 2 DAYS, 4 TABS X 2 DAYS, 3 TABS X 2 DAYS, 2 TABS X 7 DAYS, 1 TAB X 2 WEEKS (Patient not taking: Reported on 4/4/2025) 64 tablet 0    pregabalin (Lyrica) 300 mg capsule Take 1 capsule (300 mg) by mouth every 12 hours.      spironolactone (Aldactone) 100 mg tablet Take 1 tablet (100 mg) by mouth once daily as needed (swelling). (Patient not taking: Reported on 4/4/2025) 90 tablet 1    spironolactone (Aldactone) 100 mg tablet Take 1 tablet (100 mg) by mouth once daily for 15 days. 15 tablet 0    spironolactone (Aldactone) 50 mg tablet TAKE 2 TABLETS (100 MG) BY MOUTH ONCE DAILY. ONCE THE SWELLING GOES DOWN, CUT THE DOSE TO HALF (Patient not taking: Reported on 4/4/2025) 60 tablet 1    spironolactone (Aldactone) 50 mg tablet Take 1 tablet (50 mg) by mouth once daily. Adjust  medication based on swelling 30 tablet 1    torsemide (Demadex) 20 mg tablet TAKE 2 TABLETS (40 MG) BY MOUTH ONCE DAILY. ONCE THE SWELLING IS DOWN, CUT THE DOSE TO HALF (Patient not taking: Reported on 4/4/2025) 60 tablet 2    traZODone (Desyrel) 100 mg tablet TAKE 2 TABLETS (200 MG) BY MOUTH AS NEEDED AT BEDTIME FOR SLEEP. (Patient not taking: Reported on 4/4/2025) 180 tablet 1     No current facility-administered medications for this visit.

## 2025-06-13 ENCOUNTER — TELEPHONE (OUTPATIENT)
Dept: PRIMARY CARE | Facility: CLINIC | Age: 55
End: 2025-06-13
Payer: COMMERCIAL

## 2025-06-13 NOTE — TELEPHONE ENCOUNTER
Pt states that pain management prescribed a muscle relaxer 3 days ago. She was instructed to stop tramadol per dr miguel(?) because of the muscle relaxer.  She wants to stop muscle relaxer and re start tramadol. Please advise

## 2025-06-13 NOTE — TELEPHONE ENCOUNTER
LM for patient to call back    Dano Rader MD to Me (Selected Message)        6/13/25  2:08 PM  She can do that.  She is at her nursing home.  She needs to talk to the nurse practitioner whose name is Daniel Cr and he can address the situation there

## 2025-06-24 ENCOUNTER — NURSING HOME VISIT (OUTPATIENT)
Dept: POST ACUTE CARE | Facility: EXTERNAL LOCATION | Age: 55
End: 2025-06-24
Payer: COMMERCIAL

## 2025-06-24 DIAGNOSIS — R07.89 OTHER CHEST PAIN: ICD-10-CM

## 2025-06-24 DIAGNOSIS — N39.0 ACUTE UTI: ICD-10-CM

## 2025-06-24 DIAGNOSIS — N18.30 STAGE 3 CHRONIC KIDNEY DISEASE, UNSPECIFIED WHETHER STAGE 3A OR 3B CKD (MULTI): ICD-10-CM

## 2025-06-24 DIAGNOSIS — F41.9 ANXIETY: Primary | ICD-10-CM

## 2025-06-24 PROCEDURE — 99308 SBSQ NF CARE LOW MDM 20: CPT | Performed by: EMERGENCY MEDICINE

## 2025-06-24 NOTE — LETTER
Patient: Li Allison  : 1970    Encounter Date: 2025    Li Allison   54 y.o.  female  @location@            Assessment and Plan:  History and physical        Left hip fracture  anemia  Mild cervical spine arthropathy  Hypertension  Morbid obesity      Patient underwent left hip hemiarthroplasty  50% weightbearing  Patient to use walker for 3 weeks  Gomez catheter to be pulled out today  Pain control  Patient on antibiotics for prophylaxis  Patient does not want SCDs  Patient also declined lab draws  OT PT eval  Plan for patient to be discharged back to [facility]    check for blood in stool  iron studies    lovenox     protonix    Discontinue daily labs     Provide safe environment for the patient     Continue current medication regimen     OT PT and speech therapy     Bowel and bladder,skin care     Nutritional support     monitor and treat blood glucose     GI  and DVT prophylaxis     PRN medications     Periodic lab work    Regular Follow up    Charting is done using voice recognition software and may contain errors which have not been completely corrected    Source of history: Nurse, Medical personnel, Medical record, Patient.  History limitation: None.    HPI:      Patient is unable to give any detailed history and therefore history is obtained from the chart  No acute complaints voiced by the patient or acute concerns raised by nursing    History of hospitalization-  History of Present Illness   54-year-old female with CKD, hypertension, anemia, morbid obesity comes to the hospital with the inability to ambulate.  Patient was recently admitted in April for bilateral lower extremity cellulitis and ROB and generalized weakness.  She then was sent to a SNF.  Patient apparently went to see outpatient orthopedist consult for difficulty with walking and was found to have a subacute femoral neck fracture on the left.  Patient was transferred back to [facility] and is now here for a left hip implant.   Prior to surgeon taking patient back for surgery, patient is to have evaluation of her cervical spine with an MRI.  Patient complains of some numbness in her left lateral fingers but otherwise has normal strength normal symmetric muscles bilaterally.  It is unclear how long patient has had a hip fracture     Problem List/Past Medical History Ongoing Abnormal weight gain Acquired spondylolisthesis Acute back pain with sciatica Acute sinusitis Acute urinary tract infection Anxiety Arthritis of right hip Atypical ductal hyperplasia of left breast Bacterial infectious disease Breast lump Cellulitis Dyspnea on exertion Edema Essential hypertension Gastroesophageal reflux disease Hip pain Insomnia Iron deficiency anemia Left ventricular hypertrophy Low back pain Lumbosacral spondylosis with radiculopathy Mammography abnormal Needs influenza immunization Pain in right hip joint Pain in right lower limb Pain of right knee region Patient encounter status Peripheral edemaProblem related to primary support group, unspecified Swelling of lower limb Historical Depression Procedure/Surgical History   esophagogastroduodenoscopy(EGD). (2025)   Colonoscopy. with HET (2018)   Biopsy Breast Core Ultrasound Guided. (2017)   ear surgery ()   Tubal Ligation    Section x2    Allergies No Known Medication Allergies Social History   Alcohol - Denies Alcohol Use   Home/Environment Lives with:Children, Spouse *Living Situation Prior to AdmissionHome with assistance Home equipment:Walker/Cane Monitoring Equipment in homeNone *Special Services and Community Resources Prior to AdmissionNone *Mobility Assistance Prior to AdmissionPartial assistance *Will patient require additional/new services upon discharge?No   Sexual Other contraceptive use:Tubal Ligation   Substance Abuse - Denies Substance Abuse   Tobacco - Denies Tobacco Use Use:Never (less than 100 in lifetime) Family History Breast cancer....: Mother.      Current Medications[1]    Physical Exam:  Vital signs as per nursing/MA documentation were reviewed  General appearance: Alert and in no acute distress  HEENT: Normal Inspection  Neck - Normal Inspection  Respiratory : No respiratory distress. Lungs are clear   Cardiovascular: heart rate normal. No gallop  Back - normal inspection  Skin inspection:Warm  Musculoskeletal : No deformities  Neuro : Limited exam. Baseline    ROS: Review of symptoms is negative except for what is mentioned in HPI    Results/Data  Records including but not limited to electronic medical records, relevant lab work and imaging from patient's health care facility encounter were reviewed and independently verified      Charting was completed using voice recognition technology and may include unintended errors.    Discussed with patient/family, RN, and NP.        Electronically Signed By: Dano Rader MD   7/1/25  5:29 PM       [1]  Current Outpatient Medications   Medication Sig Dispense Refill   • albuterol 90 mcg/actuation inhaler INHALE 2 PUFFS EVERY 4 HOURS IF NEEDED FOR WHEEZING OR SHORTNESS OF BREATH. 18 g 5   • budesonide-glycopyr-formoterol (Breztri Aerosphere) 160-9-4.8 mcg/actuation HFA aerosol inhaler Inhale 2 puffs 2 times a day. 10.7 g 2   • bumetanide (Bumex) 2 mg tablet Take 0.5 tablets (1 mg) by mouth once daily. 30 tablet 2   • cyclobenzaprine (Flexeril) 10 mg tablet TAKE 1 TABLET (10 MG) BY MOUTH 3 TIMES A DAY AS NEEDED FOR MUSCLE SPASMS FOR UP TO 10 DAYS. 30 tablet 0   • DULoxetine (Cymbalta) 30 mg DR capsule TAKE 1 CAPSULE (30 MG) BY MOUTH ONCE DAILY. DO NOT CRUSH OR CHEW. 90 capsule 1   • fluticasone (Flonase) 50 mcg/actuation nasal spray Administer 2 sprays into each nostril once daily.     • furosemide (Lasix) 20 mg tablet TAKE 1 TABLET BY MOUTH EVERY DAY (Patient not taking: Reported on 4/4/2025) 90 tablet 1   • furosemide (Lasix) 40 mg tablet TAKE 1 TABLET (40 MG) BY MOUTH 2 TIMES DAILY (MORNING AND LATE AFTERNOON).  60 tablet 1   • gabapentin (Neurontin) 100 mg capsule TAKE 1 CAPSULE BY MOUTH THREE TIMES A DAY 90 capsule 2   • Lasix 40 mg tablet Take 1 tablet (40 mg) by mouth once daily for 7 days. 7 tablet 0   • losartan (Cozaar) 50 mg tablet TAKE 1 TABLET BY MOUTH EVERY DAY 90 tablet 1   • meloxicam (Mobic) 15 mg tablet TAKE 1 TABLET BY MOUTH EVERY DAY 90 tablet 3   • potassium (POTASSIMIN ORAL) Take 50 mg by mouth.     • predniSONE (Deltasone) 10 mg tablet 6 TABS X 2 DAYS, 5 TABS X 2 DAYS, 4 TABS X 2 DAYS, 3 TABS X 2 DAYS, 2 TABS X 7 DAYS, 1 TAB X 2 WEEKS (Patient not taking: Reported on 4/4/2025) 64 tablet 0   • pregabalin (Lyrica) 300 mg capsule Take 1 capsule (300 mg) by mouth every 12 hours.     • spironolactone (Aldactone) 100 mg tablet Take 1 tablet (100 mg) by mouth once daily as needed (swelling). (Patient not taking: Reported on 4/4/2025) 90 tablet 1   • spironolactone (Aldactone) 100 mg tablet Take 1 tablet (100 mg) by mouth once daily for 15 days. 15 tablet 0   • spironolactone (Aldactone) 50 mg tablet TAKE 2 TABLETS (100 MG) BY MOUTH ONCE DAILY. ONCE THE SWELLING GOES DOWN, CUT THE DOSE TO HALF (Patient not taking: Reported on 4/4/2025) 60 tablet 1   • spironolactone (Aldactone) 50 mg tablet Take 1 tablet (50 mg) by mouth once daily. Adjust medication based on swelling 30 tablet 1   • torsemide (Demadex) 20 mg tablet TAKE 2 TABLETS (40 MG) BY MOUTH ONCE DAILY. ONCE THE SWELLING IS DOWN, CUT THE DOSE TO HALF (Patient not taking: Reported on 4/4/2025) 60 tablet 2   • traMADol (Ultram) 50 mg tablet Take 1 tablet (50 mg) by mouth every 12 hours. Take 1 to 2 tablets     • traZODone (Desyrel) 100 mg tablet TAKE 2 TABLETS (200 MG) BY MOUTH AS NEEDED AT BEDTIME FOR SLEEP. (Patient not taking: Reported on 4/4/2025) 180 tablet 1     No current facility-administered medications for this visit.

## 2025-06-30 ENCOUNTER — TELEPHONE (OUTPATIENT)
Dept: PRIMARY CARE | Facility: CLINIC | Age: 55
End: 2025-06-30
Payer: COMMERCIAL

## 2025-06-30 NOTE — TELEPHONE ENCOUNTER
PT is at [facility] and wants to know if Dr Rader will release her or someone else at the facility?

## 2025-07-01 NOTE — PROGRESS NOTES
Li Allison   54 y.o.  female  @location@            Assessment and Plan:  History and physical        Left hip fracture  anemia  Mild cervical spine arthropathy  Hypertension  Morbid obesity      Patient underwent left hip hemiarthroplasty  50% weightbearing  Patient to use walker for 3 weeks  Gomez catheter to be pulled out today  Pain control  Patient on antibiotics for prophylaxis  Patient does not want SCDs  Patient also declined lab draws  OT PT eval  Plan for patient to be discharged back to [facility]    check for blood in stool  iron studies    lovenox     protonix    Discontinue daily labs     Provide safe environment for the patient     Continue current medication regimen     OT PT and speech therapy     Bowel and bladder,skin care     Nutritional support     monitor and treat blood glucose     GI  and DVT prophylaxis     PRN medications     Periodic lab work    Regular Follow up    Charting is done using voice recognition software and may contain errors which have not been completely corrected    Source of history: Nurse, Medical personnel, Medical record, Patient.  History limitation: None.    HPI:      Patient is unable to give any detailed history and therefore history is obtained from the chart  No acute complaints voiced by the patient or acute concerns raised by nursing    History of hospitalization-  History of Present Illness   54-year-old female with CKD, hypertension, anemia, morbid obesity comes to the hospital with the inability to ambulate.  Patient was recently admitted in April for bilateral lower extremity cellulitis and ROB and generalized weakness.  She then was sent to a SNF.  Patient apparently went to see outpatient orthopedist consult for difficulty with walking and was found to have a subacute femoral neck fracture on the left.  Patient was transferred back to [facility] and is now here for a left hip implant.  Prior to surgeon taking patient back for surgery, patient is to have  evaluation of her cervical spine with an MRI.  Patient complains of some numbness in her left lateral fingers but otherwise has normal strength normal symmetric muscles bilaterally.  It is unclear how long patient has had a hip fracture     Problem List/Past Medical History Ongoing Abnormal weight gain Acquired spondylolisthesis Acute back pain with sciatica Acute sinusitis Acute urinary tract infection Anxiety Arthritis of right hip Atypical ductal hyperplasia of left breast Bacterial infectious disease Breast lump Cellulitis Dyspnea on exertion Edema Essential hypertension Gastroesophageal reflux disease Hip pain Insomnia Iron deficiency anemia Left ventricular hypertrophy Low back pain Lumbosacral spondylosis with radiculopathy Mammography abnormal Needs influenza immunization Pain in right hip joint Pain in right lower limb Pain of right knee region Patient encounter status Peripheral edemaProblem related to primary support group, unspecified Swelling of lower limb Historical Depression Procedure/Surgical History   esophagogastroduodenoscopy(EGD). (2025)   Colonoscopy. with HET (2018)   Biopsy Breast Core Ultrasound Guided. (2017)   ear surgery ()   Tubal Ligation    Section x2    Allergies No Known Medication Allergies Social History   Alcohol - Denies Alcohol Use   Home/Environment Lives with:Children, Spouse *Living Situation Prior to AdmissionHome with assistance Home equipment:Walker/Cane Monitoring Equipment in homeNone *Special Services and Community Resources Prior to AdmissionNone *Mobility Assistance Prior to AdmissionPartial assistance *Will patient require additional/new services upon discharge?No   Sexual Other contraceptive use:Tubal Ligation   Substance Abuse - Denies Substance Abuse   Tobacco - Denies Tobacco Use Use:Never (less than 100 in lifetime) Family History Breast cancer....: Mother.     Current Medications[1]    Physical Exam:  Vital signs as per nursing/MA  documentation were reviewed  General appearance: Alert and in no acute distress  HEENT: Normal Inspection  Neck - Normal Inspection  Respiratory : No respiratory distress. Lungs are clear   Cardiovascular: heart rate normal. No gallop  Back - normal inspection  Skin inspection:Warm  Musculoskeletal : No deformities  Neuro : Limited exam. Baseline    ROS: Review of symptoms is negative except for what is mentioned in HPI    Results/Data  Records including but not limited to electronic medical records, relevant lab work and imaging from patient's health care facility encounter were reviewed and independently verified      Charting was completed using voice recognition technology and may include unintended errors.    Discussed with patient/family, RN, and NP.         [1]   Current Outpatient Medications   Medication Sig Dispense Refill    albuterol 90 mcg/actuation inhaler INHALE 2 PUFFS EVERY 4 HOURS IF NEEDED FOR WHEEZING OR SHORTNESS OF BREATH. 18 g 5    budesonide-glycopyr-formoterol (Breztri Aerosphere) 160-9-4.8 mcg/actuation HFA aerosol inhaler Inhale 2 puffs 2 times a day. 10.7 g 2    bumetanide (Bumex) 2 mg tablet Take 0.5 tablets (1 mg) by mouth once daily. 30 tablet 2    cyclobenzaprine (Flexeril) 10 mg tablet TAKE 1 TABLET (10 MG) BY MOUTH 3 TIMES A DAY AS NEEDED FOR MUSCLE SPASMS FOR UP TO 10 DAYS. 30 tablet 0    DULoxetine (Cymbalta) 30 mg DR capsule TAKE 1 CAPSULE (30 MG) BY MOUTH ONCE DAILY. DO NOT CRUSH OR CHEW. 90 capsule 1    fluticasone (Flonase) 50 mcg/actuation nasal spray Administer 2 sprays into each nostril once daily.      furosemide (Lasix) 20 mg tablet TAKE 1 TABLET BY MOUTH EVERY DAY (Patient not taking: Reported on 4/4/2025) 90 tablet 1    furosemide (Lasix) 40 mg tablet TAKE 1 TABLET (40 MG) BY MOUTH 2 TIMES DAILY (MORNING AND LATE AFTERNOON). 60 tablet 1    gabapentin (Neurontin) 100 mg capsule TAKE 1 CAPSULE BY MOUTH THREE TIMES A DAY 90 capsule 2    Lasix 40 mg tablet Take 1 tablet (40  mg) by mouth once daily for 7 days. 7 tablet 0    losartan (Cozaar) 50 mg tablet TAKE 1 TABLET BY MOUTH EVERY DAY 90 tablet 1    meloxicam (Mobic) 15 mg tablet TAKE 1 TABLET BY MOUTH EVERY DAY 90 tablet 3    potassium (POTASSIMIN ORAL) Take 50 mg by mouth.      predniSONE (Deltasone) 10 mg tablet 6 TABS X 2 DAYS, 5 TABS X 2 DAYS, 4 TABS X 2 DAYS, 3 TABS X 2 DAYS, 2 TABS X 7 DAYS, 1 TAB X 2 WEEKS (Patient not taking: Reported on 4/4/2025) 64 tablet 0    pregabalin (Lyrica) 300 mg capsule Take 1 capsule (300 mg) by mouth every 12 hours.      spironolactone (Aldactone) 100 mg tablet Take 1 tablet (100 mg) by mouth once daily as needed (swelling). (Patient not taking: Reported on 4/4/2025) 90 tablet 1    spironolactone (Aldactone) 100 mg tablet Take 1 tablet (100 mg) by mouth once daily for 15 days. 15 tablet 0    spironolactone (Aldactone) 50 mg tablet TAKE 2 TABLETS (100 MG) BY MOUTH ONCE DAILY. ONCE THE SWELLING GOES DOWN, CUT THE DOSE TO HALF (Patient not taking: Reported on 4/4/2025) 60 tablet 1    spironolactone (Aldactone) 50 mg tablet Take 1 tablet (50 mg) by mouth once daily. Adjust medication based on swelling 30 tablet 1    torsemide (Demadex) 20 mg tablet TAKE 2 TABLETS (40 MG) BY MOUTH ONCE DAILY. ONCE THE SWELLING IS DOWN, CUT THE DOSE TO HALF (Patient not taking: Reported on 4/4/2025) 60 tablet 2    traMADol (Ultram) 50 mg tablet Take 1 tablet (50 mg) by mouth every 12 hours. Take 1 to 2 tablets      traZODone (Desyrel) 100 mg tablet TAKE 2 TABLETS (200 MG) BY MOUTH AS NEEDED AT BEDTIME FOR SLEEP. (Patient not taking: Reported on 4/4/2025) 180 tablet 1     No current facility-administered medications for this visit.

## 2025-07-07 ENCOUNTER — DOCUMENTATION (OUTPATIENT)
Dept: PRIMARY CARE | Facility: CLINIC | Age: 55
End: 2025-07-07
Payer: COMMERCIAL

## 2025-07-08 ENCOUNTER — TELEPHONE (OUTPATIENT)
Dept: PRIMARY CARE | Facility: CLINIC | Age: 55
End: 2025-07-08
Payer: COMMERCIAL

## 2025-07-08 DIAGNOSIS — Z12.31 ENCOUNTER FOR SCREENING MAMMOGRAM FOR BREAST CANCER: ICD-10-CM

## 2025-07-08 NOTE — TELEPHONE ENCOUNTER
PT has an appointment 7/11/25 which she picked.  She is wondering if an antibiotic can be prescribed for the sore on her calf prior to appointment.

## 2025-07-10 ENCOUNTER — TELEPHONE (OUTPATIENT)
Dept: PRIMARY CARE | Facility: CLINIC | Age: 55
End: 2025-07-10
Payer: COMMERCIAL

## 2025-07-10 NOTE — TELEPHONE ENCOUNTER
Nurse  Denia,  called from [facility] called letting us know that she was in the hospital form 5/8/25- 5/15/25 and in rehab from 5/15/25- 7/4/25. She is eligable to . Would like you to know if there any needs that you think she would need, like affording medication, educational needs, or any kind of health care needs, They are able to help her out with .     [phone] ext: [phone]      -----MENDEZ-------

## 2025-07-10 NOTE — TELEPHONE ENCOUNTER
MENDEZ Loza from [facility] called to state PT was in rehab and now home.      Any questions she can be reached at [phone]    It was a toll free number and I let them know that they need to fax our office if information is needed because of our PT's privacy.

## 2025-07-11 ENCOUNTER — OFFICE VISIT (OUTPATIENT)
Dept: PRIMARY CARE | Facility: CLINIC | Age: 55
End: 2025-07-11
Payer: COMMERCIAL

## 2025-07-11 VITALS
OXYGEN SATURATION: 96 % | WEIGHT: 280 LBS | DIASTOLIC BLOOD PRESSURE: 59 MMHG | SYSTOLIC BLOOD PRESSURE: 103 MMHG | BODY MASS INDEX: 52.87 KG/M2 | HEART RATE: 69 BPM | HEIGHT: 61 IN

## 2025-07-11 DIAGNOSIS — N17.9 AKI (ACUTE KIDNEY INJURY): ICD-10-CM

## 2025-07-11 DIAGNOSIS — A49.9 BACTERIAL INFECTION: ICD-10-CM

## 2025-07-11 DIAGNOSIS — S72.002D CLOSED FRACTURE OF LEFT HIP WITH ROUTINE HEALING: ICD-10-CM

## 2025-07-11 DIAGNOSIS — N18.30 STAGE 3 CHRONIC KIDNEY DISEASE, UNSPECIFIED WHETHER STAGE 3A OR 3B CKD (MULTI): ICD-10-CM

## 2025-07-11 DIAGNOSIS — S72.009D CLOSED FRACTURE OF HIP WITH ROUTINE HEALING, UNSPECIFIED LATERALITY, SUBSEQUENT ENCOUNTER: ICD-10-CM

## 2025-07-11 DIAGNOSIS — Z76.89 ENCOUNTER FOR SUPPORT AND COORDINATION OF TRANSITION OF CARE: Primary | ICD-10-CM

## 2025-07-11 RX ORDER — OXYCODONE AND ACETAMINOPHEN 5; 325 MG/1; MG/1
1 TABLET ORAL EVERY 8 HOURS PRN
Qty: 21 TABLET | Refills: 0 | Status: SHIPPED | OUTPATIENT
Start: 2025-07-11 | End: 2025-07-18

## 2025-07-11 RX ORDER — DOXYCYCLINE HYCLATE 100 MG
100 TABLET ORAL 2 TIMES DAILY
Qty: 14 TABLET | Refills: 0 | Status: SHIPPED | OUTPATIENT
Start: 2025-07-11 | End: 2025-07-18

## 2025-07-11 NOTE — PROGRESS NOTES
TCM, discharged from [facility] on 7/4/25 , patient did receive their discharge medications. Patient does not require immediate attention of the attending at this time and is scheduled for an appointment on 7/11/25.

## 2025-07-11 NOTE — PROGRESS NOTES
Li Allison   54 y.o.  female  @location@            Assessment and Plan:    Transitional care management      Left hip fracture  anemia  Mild cervical spine arthropathy  Hypertension  Morbid obesity      Pedal edema-patient has 3+ pedal edema.  She was counseled that she should drink less liquids.  She has torsemide and spironolactone at home.  Counseled her that she should start with higher dose of torsemide and spironolactone and as the swelling goes down, she can cut the dose down     Wound infection on both legs-doxycycline for 1 week    Neuropathic pain/anxiety and depression-increase Cymbalta to 60      Patient underwent left hip hemiarthroplasty  50% weightbearing  Patient to use walker for 3 weeks  Gomez catheter to be pulled out today  Pain control  Patient on antibiotics for prophylaxis  Patient does not want SCDs  Patient also declined lab draws  OT PT eval  Plan for patient to be discharged back to [facility]    check for blood in stool  iron studies    lovenox     protonix    Discontinue daily labs     Source of history: Nurse, Medical personnel, Medical record, Patient.  History limitation: None.    HPI:    Patient was discharged from [facility] facility_ hospital on July 4_ and there was interactive contact by patient/family or facility staff on behalf of patient with me/office within 2 working days regarding patient's transition    Patient was recently admitted to the hospital.  Patient's history regarding the reason for hospital admission and the course in the hospital was reviewed with patinet and/or family.  Patient's medical records including lab work, radiology and other medical notes were reviewed.  His medication list prior to admission and discharge medication list are compared and updated.    Patient encounter was done face-to-face    Patient is unable to give detailed history and therefore history is obtained from the chart    History from hospitalization-    Patient is unable to  give any detailed history and therefore history is obtained from the chart  No acute complaints voiced by the patient or acute concerns raised by nursing    History of hospitalization-  History of Present Illness   54-year-old female with CKD, hypertension, anemia, morbid obesity comes to the hospital with the inability to ambulate.  Patient was recently admitted in April for bilateral lower extremity cellulitis and ROB and generalized weakness.  She then was sent to a SNF.  Patient apparently went to see outpatient orthopedist consult for difficulty with walking and was found to have a subacute femoral neck fracture on the left.  Patient was transferred back to [facility] and is now here for a left hip implant.  Prior to surgeon taking patient back for surgery, patient is to have evaluation of her cervical spine with an MRI.  Patient complains of some numbness in her left lateral fingers but otherwise has normal strength normal symmetric muscles bilaterally.  It is unclear how long patient has had a hip fracture     Problem List/Past Medical History Ongoing Abnormal weight gain Acquired spondylolisthesis Acute back pain with sciatica Acute sinusitis Acute urinary tract infection Anxiety Arthritis of right hip Atypical ductal hyperplasia of left breast Bacterial infectious disease Breast lump Cellulitis Dyspnea on exertion Edema Essential hypertension Gastroesophageal reflux disease Hip pain Insomnia Iron deficiency anemia Left ventricular hypertrophy Low back pain Lumbosacral spondylosis with radiculopathy Mammography abnormal Needs influenza immunization Pain in right hip joint Pain in right lower limb Pain of right knee region Patient encounter status Peripheral edemaProblem related to primary support group, unspecified Swelling of lower limb Historical Depression Procedure/Surgical History   esophagogastroduodenoscopy(EGD). (03/31/2025)   Colonoscopy. with HET (12/28/2018)   Biopsy Breast Core Ultrasound Guided.  (2017)   ear surgery (1970)   Tubal Ligation    Section x2    Allergies No Known Medication Allergies Social History   Alcohol - Denies Alcohol Use   Home/Environment Lives with:Children, Spouse *Living Situation Prior to AdmissionHome with assistance Home equipment:Walker/Cane Monitoring Equipment in homeNone *Special Services and Community Resources Prior to AdmissionNone *Mobility Assistance Prior to AdmissionPartial assistance *Will patient require additional/new services upon discharge?No   Sexual Other contraceptive use:Tubal Ligation   Substance Abuse - Denies Substance Abuse   Tobacco - Denies Tobacco Use Use:Never (less than 100 in lifetime) Family History Breast cancer....: Mother.     Current Medications[1]    Physical Exam:  Vital signs as per nursing/MA documentation were reviewed  General appearance: Alert and in no acute distress  HEENT: Normal Inspection  Neck - Normal Inspection  Respiratory : No respiratory distress. Lungs are clear   Cardiovascular: heart rate normal. No gallop  Back - normal inspection  Skin inspection:Warm  Musculoskeletal : No deformities  Neuro : Limited exam. Baseline    ROS: Review of symptoms is negative except for what is mentioned in HPI    Results/Data  Records including but not limited to electronic medical records, relevant lab work and imaging from patient's health care facility encounter were reviewed and independently verified      Charting was completed using voice recognition technology and may include unintended errors.    Discussed with patient/family, RN, and NP.         [1]   Current Outpatient Medications   Medication Sig Dispense Refill    albuterol 90 mcg/actuation inhaler INHALE 2 PUFFS EVERY 4 HOURS IF NEEDED FOR WHEEZING OR SHORTNESS OF BREATH. 18 g 5    budesonide-glycopyr-formoterol (Breztri Aerosphere) 160-9-4.8 mcg/actuation HFA aerosol inhaler Inhale 2 puffs 2 times a day. 10.7 g 2    DULoxetine (Cymbalta) 30 mg DR capsule TAKE 1  CAPSULE (30 MG) BY MOUTH ONCE DAILY. DO NOT CRUSH OR CHEW. 90 capsule 1    fluticasone (Flonase) 50 mcg/actuation nasal spray Administer 2 sprays into each nostril once daily.      potassium (POTASSIMIN ORAL) Take 50 mg by mouth.      pregabalin (Lyrica) 300 mg capsule Take 1 capsule (300 mg) by mouth every 12 hours.      spironolactone (Aldactone) 100 mg tablet Take 1 tablet (100 mg) by mouth once daily for 15 days. 15 tablet 0    traMADol (Ultram) 50 mg tablet Take 1 tablet (50 mg) by mouth every 12 hours. Take 1 to 2 tablets      traZODone (Desyrel) 100 mg tablet TAKE 2 TABLETS (200 MG) BY MOUTH AS NEEDED AT BEDTIME FOR SLEEP. 180 tablet 1    bumetanide (Bumex) 2 mg tablet Take 0.5 tablets (1 mg) by mouth once daily. 30 tablet 2    cyclobenzaprine (Flexeril) 10 mg tablet TAKE 1 TABLET (10 MG) BY MOUTH 3 TIMES A DAY AS NEEDED FOR MUSCLE SPASMS FOR UP TO 10 DAYS. (Patient not taking: Reported on 7/11/2025) 30 tablet 0    furosemide (Lasix) 20 mg tablet TAKE 1 TABLET BY MOUTH EVERY DAY (Patient not taking: Reported on 4/4/2025) 90 tablet 1    furosemide (Lasix) 40 mg tablet TAKE 1 TABLET (40 MG) BY MOUTH 2 TIMES DAILY (MORNING AND LATE AFTERNOON). 60 tablet 1    gabapentin (Neurontin) 100 mg capsule TAKE 1 CAPSULE BY MOUTH THREE TIMES A DAY 90 capsule 2    Lasix 40 mg tablet Take 1 tablet (40 mg) by mouth once daily for 7 days. 7 tablet 0    losartan (Cozaar) 50 mg tablet TAKE 1 TABLET BY MOUTH EVERY DAY 90 tablet 1    meloxicam (Mobic) 15 mg tablet TAKE 1 TABLET BY MOUTH EVERY DAY 90 tablet 3    predniSONE (Deltasone) 10 mg tablet 6 TABS X 2 DAYS, 5 TABS X 2 DAYS, 4 TABS X 2 DAYS, 3 TABS X 2 DAYS, 2 TABS X 7 DAYS, 1 TAB X 2 WEEKS (Patient not taking: Reported on 7/11/2025) 64 tablet 0    spironolactone (Aldactone) 100 mg tablet Take 1 tablet (100 mg) by mouth once daily as needed (swelling). (Patient not taking: Reported on 3/21/2025) 90 tablet 1    spironolactone (Aldactone) 50 mg tablet TAKE 2 TABLETS (100 MG) BY  MOUTH ONCE DAILY. ONCE THE SWELLING GOES DOWN, CUT THE DOSE TO HALF (Patient not taking: Reported on 4/4/2025) 60 tablet 1    spironolactone (Aldactone) 50 mg tablet Take 1 tablet (50 mg) by mouth once daily. Adjust medication based on swelling 30 tablet 1    torsemide (Demadex) 20 mg tablet TAKE 2 TABLETS (40 MG) BY MOUTH ONCE DAILY. ONCE THE SWELLING IS DOWN, CUT THE DOSE TO HALF 60 tablet 2     No current facility-administered medications for this visit.

## 2025-07-14 ENCOUNTER — TELEPHONE (OUTPATIENT)
Dept: PRIMARY CARE | Facility: CLINIC | Age: 55
End: 2025-07-14
Payer: COMMERCIAL

## 2025-07-14 NOTE — TELEPHONE ENCOUNTER
When she was seen on 7/11/25 she was told to ask pain management for a refill on these. Percocet was sent over for her in the meantime. Spoke to patient and she said that she understood

## 2025-07-16 ENCOUNTER — TELEPHONE (OUTPATIENT)
Dept: PRIMARY CARE | Facility: CLINIC | Age: 55
End: 2025-07-16
Payer: COMMERCIAL

## 2025-07-16 NOTE — TELEPHONE ENCOUNTER
PT was in Friday and she asked pain management what meds they could prescribe and Pain management prescribed Tramadol and Lyrica    Pain management will not prescribe Oxycodone and PT is stating she is without and the weekend is coming up.

## 2025-07-17 DIAGNOSIS — F41.9 ANXIETY: ICD-10-CM

## 2025-07-17 NOTE — TELEPHONE ENCOUNTER
Spoke to patient otp and informed her and she was upset and said that Dr. Rader told her to see a different doctor in her last appt. She hung up afterwards

## 2025-07-18 RX ORDER — DULOXETIN HYDROCHLORIDE 30 MG/1
30 CAPSULE, DELAYED RELEASE ORAL DAILY
Qty: 90 CAPSULE | Refills: 1 | Status: SHIPPED | OUTPATIENT
Start: 2025-07-18

## 2025-08-16 DIAGNOSIS — R60.9 EDEMA, UNSPECIFIED TYPE: ICD-10-CM

## 2025-08-18 RX ORDER — TORSEMIDE 20 MG/1
80 TABLET ORAL
Qty: 120 TABLET | Refills: 0 | OUTPATIENT
Start: 2025-08-18

## 2025-08-21 DIAGNOSIS — R06.02 SOBOE (SHORTNESS OF BREATH ON EXERTION): ICD-10-CM

## 2025-08-22 RX ORDER — ALBUTEROL SULFATE 90 UG/1
2 INHALANT RESPIRATORY (INHALATION) EVERY 4 HOURS PRN
Qty: 18 G | Refills: 1 | Status: SHIPPED | OUTPATIENT
Start: 2025-08-22

## 2025-08-29 ENCOUNTER — APPOINTMENT (OUTPATIENT)
Dept: PRIMARY CARE | Facility: CLINIC | Age: 55
End: 2025-08-29
Payer: COMMERCIAL

## 2025-08-29 VITALS
BODY MASS INDEX: 52.87 KG/M2 | HEIGHT: 61 IN | WEIGHT: 280 LBS | SYSTOLIC BLOOD PRESSURE: 117 MMHG | HEART RATE: 86 BPM | DIASTOLIC BLOOD PRESSURE: 72 MMHG | OXYGEN SATURATION: 94 %

## 2025-08-29 DIAGNOSIS — L03.119 CELLULITIS OF LOWER EXTREMITY, UNSPECIFIED LATERALITY: ICD-10-CM

## 2025-08-29 DIAGNOSIS — S72.002D CLOSED FRACTURE OF LEFT HIP WITH ROUTINE HEALING: ICD-10-CM

## 2025-08-29 DIAGNOSIS — Z12.31 VISIT FOR SCREENING MAMMOGRAM: ICD-10-CM

## 2025-08-29 DIAGNOSIS — E66.9 OBESITY, UNSPECIFIED CLASS, UNSPECIFIED OBESITY TYPE, UNSPECIFIED WHETHER SERIOUS COMORBIDITY PRESENT: ICD-10-CM

## 2025-08-29 DIAGNOSIS — I10 PRIMARY HYPERTENSION: ICD-10-CM

## 2025-08-29 DIAGNOSIS — E66.01 MORBID OBESITY (MULTI): Primary | ICD-10-CM

## 2025-08-29 DIAGNOSIS — N18.30 STAGE 3 CHRONIC KIDNEY DISEASE, UNSPECIFIED WHETHER STAGE 3A OR 3B CKD (MULTI): ICD-10-CM

## 2025-08-29 PROCEDURE — 99214 OFFICE O/P EST MOD 30 MIN: CPT | Performed by: EMERGENCY MEDICINE

## 2025-08-29 PROCEDURE — 3074F SYST BP LT 130 MM HG: CPT | Performed by: EMERGENCY MEDICINE

## 2025-08-29 PROCEDURE — 3008F BODY MASS INDEX DOCD: CPT | Performed by: EMERGENCY MEDICINE

## 2025-08-29 PROCEDURE — 1036F TOBACCO NON-USER: CPT | Performed by: EMERGENCY MEDICINE

## 2025-08-29 PROCEDURE — 3078F DIAST BP <80 MM HG: CPT | Performed by: EMERGENCY MEDICINE

## 2025-08-29 RX ORDER — TIRZEPATIDE 2.5 MG/.5ML
2.5 INJECTION, SOLUTION SUBCUTANEOUS
Qty: 2 ML | Refills: 0 | Status: SHIPPED | OUTPATIENT
Start: 2025-08-29
